# Patient Record
Sex: MALE | Race: WHITE | NOT HISPANIC OR LATINO | Employment: OTHER | URBAN - METROPOLITAN AREA
[De-identification: names, ages, dates, MRNs, and addresses within clinical notes are randomized per-mention and may not be internally consistent; named-entity substitution may affect disease eponyms.]

---

## 2017-01-27 ENCOUNTER — HOSPITAL ENCOUNTER (OUTPATIENT)
Dept: MEDSURG UNIT | Facility: HOSPITAL | Age: 72
End: 2017-01-30
Attending: INTERNAL MEDICINE | Admitting: INTERNAL MEDICINE

## 2017-10-04 ENCOUNTER — HISTORICAL (OUTPATIENT)
Dept: RADIOLOGY | Facility: HOSPITAL | Age: 72
End: 2017-10-04

## 2017-11-21 ENCOUNTER — HISTORICAL (OUTPATIENT)
Dept: ADMINISTRATIVE | Facility: HOSPITAL | Age: 72
End: 2017-11-21

## 2017-11-21 LAB
ABS NEUT (OLG): 5.6 X10(3)/MCL (ref 1.5–6.9)
ALBUMIN SERPL-MCNC: 3.3 GM/DL (ref 3.4–5)
ALBUMIN/GLOB SERPL: 0.7 RATIO
ALP SERPL-CCNC: 63 UNIT/L (ref 30–113)
ALT SERPL-CCNC: 40 UNIT/L (ref 10–45)
AST SERPL-CCNC: 32 UNIT/L (ref 15–37)
BASOPHILS # BLD AUTO: 0 X10(3)/MCL (ref 0–0.1)
BASOPHILS NFR BLD AUTO: 0 % (ref 0–1)
BILIRUB SERPL-MCNC: 0.4 MG/DL (ref 0.1–0.9)
BILIRUBIN DIRECT+TOT PNL SERPL-MCNC: 0.1 MG/DL (ref 0–0.3)
BILIRUBIN DIRECT+TOT PNL SERPL-MCNC: 0.3 MG/DL
BUN SERPL-MCNC: 16 MG/DL (ref 10–20)
CALCIUM SERPL-MCNC: 9.1 MG/DL (ref 8–10.5)
CHLORIDE SERPL-SCNC: 94 MMOL/L (ref 100–108)
CO2 SERPL-SCNC: 27 MMOL/L (ref 21–35)
CREAT SERPL-MCNC: 0.88 MG/DL (ref 0.7–1.3)
EOSINOPHIL # BLD AUTO: 0.2 X10(3)/MCL (ref 0–0.6)
EOSINOPHIL NFR BLD AUTO: 2 % (ref 0–5)
ERYTHROCYTE [DISTWIDTH] IN BLOOD BY AUTOMATED COUNT: 12.4 % (ref 11.5–17)
ERYTHROCYTE [SEDIMENTATION RATE] IN BLOOD: 50 MM/HR (ref 0–15)
GLOBULIN SER-MCNC: 4.6 GM/DL
GLUCOSE SERPL-MCNC: 175 MG/DL (ref 75–116)
HCT VFR BLD AUTO: 36.1 % (ref 42–52)
HGB BLD-MCNC: 12.1 GM/DL (ref 14–18)
LYMPHOCYTES # BLD AUTO: 1.8 X10(3)/MCL (ref 0.5–4.1)
LYMPHOCYTES NFR BLD AUTO: 21.1 % (ref 15–40)
MCH RBC QN AUTO: 31 PG (ref 27–34)
MCHC RBC AUTO-ENTMCNC: 34 GM/DL (ref 31–36)
MCV RBC AUTO: 91 FL (ref 80–99)
MONOCYTES # BLD AUTO: 1.1 X10(3)/MCL (ref 0–1.1)
MONOCYTES NFR BLD AUTO: 12 % (ref 4–12)
NEUTROPHILS # BLD AUTO: 5.6 X10(3)/MCL (ref 1.5–6.9)
NEUTROPHILS NFR BLD AUTO: 64 % (ref 43–75)
PLATELET # BLD AUTO: 490 X10(3)/MCL (ref 140–400)
PMV BLD AUTO: 9.8 FL (ref 6.8–10)
POTASSIUM SERPL-SCNC: 5.4 MMOL/L (ref 3.6–5.2)
PROT SERPL-MCNC: 7.9 GM/DL (ref 6.4–8.2)
RBC # BLD AUTO: 3.95 X10(6)/MCL (ref 4.7–6.1)
SODIUM SERPL-SCNC: 129 MMOL/L (ref 135–145)
WBC # SPEC AUTO: 8.7 X10(3)/MCL (ref 4.5–11.5)

## 2017-11-22 ENCOUNTER — HISTORICAL (OUTPATIENT)
Dept: ADMINISTRATIVE | Facility: HOSPITAL | Age: 72
End: 2017-11-22

## 2017-11-27 ENCOUNTER — HISTORICAL (OUTPATIENT)
Dept: LAB | Facility: HOSPITAL | Age: 72
End: 2017-11-27

## 2017-11-27 LAB
ABS NEUT (OLG): 5.3 X10(3)/MCL (ref 1.5–6.9)
ALBUMIN SERPL-MCNC: 3.4 GM/DL (ref 3.4–5)
ALBUMIN/GLOB SERPL: 0.8 RATIO
ALP SERPL-CCNC: 64 UNIT/L (ref 30–113)
ALT SERPL-CCNC: 27 UNIT/L (ref 10–45)
AST SERPL-CCNC: 26 UNIT/L (ref 15–37)
BASOPHILS # BLD AUTO: 0 X10(3)/MCL (ref 0–0.1)
BASOPHILS NFR BLD AUTO: 1 % (ref 0–1)
BILIRUB SERPL-MCNC: 0.2 MG/DL (ref 0.1–0.9)
BILIRUBIN DIRECT+TOT PNL SERPL-MCNC: 0.1 MG/DL
BILIRUBIN DIRECT+TOT PNL SERPL-MCNC: 0.1 MG/DL (ref 0–0.3)
BUN SERPL-MCNC: 18 MG/DL (ref 10–20)
CALCIUM SERPL-MCNC: 9.2 MG/DL (ref 8–10.5)
CHLORIDE SERPL-SCNC: 95 MMOL/L (ref 100–108)
CO2 SERPL-SCNC: 29 MMOL/L (ref 21–35)
CREAT SERPL-MCNC: 1.13 MG/DL (ref 0.7–1.3)
EOSINOPHIL # BLD AUTO: 0.4 X10(3)/MCL (ref 0–0.6)
EOSINOPHIL NFR BLD AUTO: 4 % (ref 0–5)
ERYTHROCYTE [DISTWIDTH] IN BLOOD BY AUTOMATED COUNT: 12.9 % (ref 11.5–17)
ERYTHROCYTE [SEDIMENTATION RATE] IN BLOOD: 45 MM/HR (ref 0–15)
GLOBULIN SER-MCNC: 4.3 GM/DL
GLUCOSE SERPL-MCNC: 179 MG/DL (ref 75–116)
HCT VFR BLD AUTO: 35.8 % (ref 42–52)
HGB BLD-MCNC: 12.2 GM/DL (ref 14–18)
LYMPHOCYTES # BLD AUTO: 1.9 X10(3)/MCL (ref 0.5–4.1)
LYMPHOCYTES NFR BLD AUTO: 22.6 % (ref 15–40)
MCH RBC QN AUTO: 31 PG (ref 27–34)
MCHC RBC AUTO-ENTMCNC: 34 GM/DL (ref 31–36)
MCV RBC AUTO: 92 FL (ref 80–99)
MONOCYTES # BLD AUTO: 0.8 X10(3)/MCL (ref 0–1.1)
MONOCYTES NFR BLD AUTO: 9 % (ref 4–12)
NEUTROPHILS # BLD AUTO: 5.3 X10(3)/MCL (ref 1.5–6.9)
NEUTROPHILS NFR BLD AUTO: 63 % (ref 43–75)
PLATELET # BLD AUTO: 423 X10(3)/MCL (ref 140–400)
PMV BLD AUTO: 9.4 FL (ref 6.8–10)
POTASSIUM SERPL-SCNC: 4.6 MMOL/L (ref 3.6–5.2)
PROT SERPL-MCNC: 7.7 GM/DL (ref 6.4–8.2)
RBC # BLD AUTO: 3.88 X10(6)/MCL (ref 4.7–6.1)
SODIUM SERPL-SCNC: 134 MMOL/L (ref 135–145)
WBC # SPEC AUTO: 8.4 X10(3)/MCL (ref 4.5–11.5)

## 2018-04-30 ENCOUNTER — HISTORICAL (OUTPATIENT)
Dept: RADIOLOGY | Facility: HOSPITAL | Age: 73
End: 2018-04-30

## 2018-05-11 ENCOUNTER — HISTORICAL (OUTPATIENT)
Dept: SURGERY | Facility: HOSPITAL | Age: 73
End: 2018-05-11

## 2018-05-11 LAB
ABS NEUT (OLG): 6 X10(3)/MCL (ref 1.5–6.9)
ALBUMIN SERPL-MCNC: 3.6 GM/DL (ref 3.4–5)
ALBUMIN/GLOB SERPL: 0.7 RATIO
ALP SERPL-CCNC: 82 UNIT/L (ref 30–113)
ALT SERPL-CCNC: 23 UNIT/L (ref 10–45)
APTT PPP: 28.3 SECOND(S) (ref 25–35)
AST SERPL-CCNC: 20 UNIT/L (ref 15–37)
BASOPHILS # BLD AUTO: 0 X10(3)/MCL (ref 0–0.1)
BASOPHILS NFR BLD AUTO: 0 % (ref 0–1)
BILIRUB SERPL-MCNC: 0.7 MG/DL (ref 0.1–0.9)
BILIRUBIN DIRECT+TOT PNL SERPL-MCNC: 0.2 MG/DL (ref 0–0.3)
BILIRUBIN DIRECT+TOT PNL SERPL-MCNC: 0.5 MG/DL
BUN SERPL-MCNC: 10 MG/DL (ref 10–20)
CALCIUM SERPL-MCNC: 9.4 MG/DL (ref 8–10.5)
CHLORIDE SERPL-SCNC: 94 MMOL/L (ref 100–108)
CO2 SERPL-SCNC: 31 MMOL/L (ref 21–35)
CREAT SERPL-MCNC: 0.95 MG/DL (ref 0.7–1.3)
EOSINOPHIL # BLD AUTO: 0 X10(3)/MCL (ref 0–0.6)
EOSINOPHIL NFR BLD AUTO: 1 % (ref 0–5)
ERYTHROCYTE [DISTWIDTH] IN BLOOD BY AUTOMATED COUNT: 13.5 % (ref 11.5–17)
GLOBULIN SER-MCNC: 5.1 GM/DL
GLUCOSE SERPL-MCNC: 147 MG/DL (ref 75–116)
HCT VFR BLD AUTO: 40 % (ref 42–52)
HGB BLD-MCNC: 13.8 GM/DL (ref 14–18)
INR PPP: 1.1 (ref 0–1.2)
LYMPHOCYTES # BLD AUTO: 1.7 X10(3)/MCL (ref 0.5–4.1)
LYMPHOCYTES NFR BLD AUTO: 19.9 % (ref 15–40)
MCH RBC QN AUTO: 30 PG (ref 27–34)
MCHC RBC AUTO-ENTMCNC: 34 GM/DL (ref 31–36)
MCV RBC AUTO: 86 FL (ref 80–99)
MONOCYTES # BLD AUTO: 0.8 X10(3)/MCL (ref 0–1.1)
MONOCYTES NFR BLD AUTO: 9 % (ref 4–12)
NEUTROPHILS # BLD AUTO: 6 X10(3)/MCL (ref 1.5–6.9)
NEUTROPHILS NFR BLD AUTO: 70 % (ref 43–75)
PLATELET # BLD AUTO: 427 X10(3)/MCL (ref 140–400)
PMV BLD AUTO: 9.2 FL (ref 6.8–10)
POTASSIUM SERPL-SCNC: 4.4 MMOL/L (ref 3.6–5.2)
PROT SERPL-MCNC: 8.7 GM/DL (ref 6.4–8.2)
PROTHROMBIN TIME: 11.1 SECOND(S) (ref 9–12)
RBC # BLD AUTO: 4.63 X10(6)/MCL (ref 4.7–6.1)
SODIUM SERPL-SCNC: 131 MMOL/L (ref 135–145)
WBC # SPEC AUTO: 8.6 X10(3)/MCL (ref 4.5–11.5)

## 2018-05-14 ENCOUNTER — HISTORICAL (OUTPATIENT)
Dept: ANESTHESIOLOGY | Facility: HOSPITAL | Age: 73
End: 2018-05-14

## 2018-05-17 LAB — GRAM STN SPEC: NORMAL

## 2018-05-19 LAB — FINAL CULTURE: NORMAL

## 2018-05-22 ENCOUNTER — HISTORICAL (OUTPATIENT)
Dept: RADIOLOGY | Facility: HOSPITAL | Age: 73
End: 2018-05-22

## 2022-04-30 NOTE — OP NOTE
PREOPERATIVE DIAGNOSIS:  Osteomyelitis of the distal portion of the proximal phalanx of the right big toe and osteomyelitis of the proximal portion of the distal phalanx of the right big toe.    POSTOPERATIVE DIAGNOSIS:  Osteomyelitis of the distal portion of the proximal phalanx of the right big toe and osteomyelitis of the proximal portion of the distal phalanx of the right big toe.    OPERATION PERFORMED:  Exploration of the right big toe and drainage of the infected area, including abscess; hemiphalangectomy of the distal portion of the proximal phalanx of the right big toe; hemiphalangectomy of the proximal portion of the distal phalanx of the right big toe; excision of the interphalangeal joint of the right big toe.    ANESTHESIA:  Ankle block with IV sedation.    PROCEDURE IN DETAIL:  This is a 72-year-old male patient who was brought in because of chronic infection of the right big toe.  The patient had extensive workup done.  Workup has shown patient has abscess formation in the right big toe and osteomyelitis.  X-rays are showing patient has osteomyelitis of the distal portion and proximal portion of the distal phalanx, including the joint.  Hence, decision was made to drain the area and excise the bone ends on either side, proximal portion of the distal phalanx and distal portion of the proximal phalanx.  Chronic sinus and the wound excised by elliptical incision on the plantar aspect, and subsequently deeper tissue until the bone was identified.  Bone curette was used to curette the area.  The joint was completely curetted.  The joint is completely destroyed.  The distal portion of the proximal phalanx was transected and removed.  Again, the proximal portion of the distal phalanx also was transected.  In fact, I had done a hemiphalangectomy on both bones and joint interphalangeal joint was also completely excised.  Hemostasis was obtained with diathermy.  Wound thoroughly irrigated with warm saline  and subsequently packed with Iodoform gauze.      Dressing applied.  He tolerated the procedure well.  He received a gram of Ancef prior to the procedure.  He was transferred to his room in good condition.        ANA LILIA/MISBAH   DD: 05/14/2018 1829   DT: 05/14/2018 1926  Job # [id number]    cc: CHAYA Frederick M.D.

## 2022-04-30 NOTE — H&P
CHIEF COMPLAINT:  Nonhealing wound of the right big toe.    HISTORY OF PRESENT ILLNESS:  This 72-year-old male patient is well known to me for the several months.  Patient was initially seen by me in November 2017 because of an ulcer of the right big toe.  I debrided the same, and because of nonhealing ulcer I had referred him to Dr. Felix Drake, a vascular surgeon.  He has evaluated the foot and the lower extremity and no evidence of any significant arterial blockage was noted.  The patient had multiple minor debridements done in my office.  I also obtained an arterial Doppler study in November 2017, which showed patient had monophasic flow pattern of the right posterior tibial perineal and dorsalis pedis.  Hence, that is the time I have referred him to the Dr. Nikolai Drake.  He has evaluated and noticed no significant blockers to perform any vascular procedures.  In November 2017, I did debridement of soft tissue of the sole of the medial aspect of the right big toe and that area has healed.  At present, patient has ulcer on the dorsal area and has been evaluated in my office  periodically.  He had a nuclear medicine study done of the right big toe in recent past, which showed infected right great toe with the level of the white cell accumulation in the right big toe.  Also I have obtained a plain x-ray of the right foot which showed destructive bony changes in the interphalangeal joint of the great toe consistent with osteomyelitis.  The patient has nonhealing wound at present.  The patient is brought in for debridement of the area and possibly removal of part of the bone.  If necessary I will remove the whole distal phalanges also.    PAST MEDICAL HISTORY:  He has diabetes mellitus, hypertensive cardiovascular disease, hyperlipidemia, glaucoma, history of prostate malignancy, hypothyroidism, osteoarthritis, bipolar disorder, depression.    PAST SURGICAL HISTORY:  The patient has had debridement of the  foot done in the past.    SOCIAL HISTORY:  He is a CPA by profession.  He does not smoke.  No history of alcohol abuse.  No history of any illicit drug use.    FAMILY HISTORY:  Diabetes mellitus present.    MEDICATIONS:  The patient is on multiple medications, including valsartan, Flomax, Bystolic, metformin, levothyroxine, hydralazine, glimepiride, diltiazem, benztropine, aripiprazole.    REVIEW OF SYSTEMS:  RESPIRATORY:  No shortness of breath.   CARDIOVASCULAR:  No chest pain.  Known hypertensive.   NEUROLOGIC: Patient has peripheral neuropathy.   GASTROINTESTINAL:  No abdominal pain.   ENDOCRINE:  He has diabetes mellitus.   MUSCULOSKELETAL:  Vague joint pains.   GENITOURINARY:  History of prostate carcinoma.     HEMATOLOGY:  No bleeding tendencies.   PSYCHIATRIC:  He has bipolar disease, depression.     GENERAL:  Patient has an ulcerated right big toe with osteomyelitis.   OTHER SYSTEMS:  No complaints.    PHYSICAL EXAMINATION:  GENERAL:  Condition of the patient is satisfactory.   VITAL SIGNS:  Stable.   HEENT:  No scalp lesion.  Oral cavity and throat normal.     NECK:  Supple.  Thyroid, no nodules.  Trachea central.  No cervical or supraclavicular nodes are noted.  No carotid bruit.  Jugular venous pressure normal.   CHEST:  Air entry equal on both sides.     LUNGS:  Clear.  No rales or wheezing.     HEART:  Regular, no murmur.     ABDOMEN:  Soft.  Liver and spleen not enlarged.   No masses palpable.  Good bowel sounds.  Hernia sites normal.     GENITOURINARY:  Genitalia normal.   EXTREMITIES:  Femoral, popliteal pulses present.  Pedal pulses very feeble.  Right big toe shows evidence of chronic infection with ulceration sinus formation and the toe is swollen.   NEUROLOGICAL:  The patient has peripheral neuropathy.     EXTREMITIES:  Upper extremities, normal.  Spine is normal.    CLINICAL IMPRESSION:  Chronic infection of the right big toe with osteomyelitis of the phalanges of the toe.      PLAN:  The  patient is scheduled for debridement of the soft tissue and bone, possibly removing distal phalanges.        ANA LILIA/MISBAH   DD: 05/14/2018 0544   DT: 05/14/2018 0648  Job # [id number]    cc: CHAYA Frederick M.D.

## 2023-03-02 ENCOUNTER — HOSPITAL ENCOUNTER (EMERGENCY)
Facility: HOSPITAL | Age: 78
Discharge: HOME | End: 2023-03-02
Attending: EMERGENCY MEDICINE
Payer: MEDICARE

## 2023-03-02 VITALS
DIASTOLIC BLOOD PRESSURE: 100 MMHG | SYSTOLIC BLOOD PRESSURE: 187 MMHG | WEIGHT: 232 LBS | OXYGEN SATURATION: 98 % | HEIGHT: 73 IN | RESPIRATION RATE: 18 BRPM | HEART RATE: 75 BPM | TEMPERATURE: 98 F | BODY MASS INDEX: 30.75 KG/M2

## 2023-03-02 DIAGNOSIS — S50.02XA CONTUSION OF LEFT ELBOW, INITIAL ENCOUNTER: ICD-10-CM
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
## 2023-03-02 DIAGNOSIS — J18.9 ATYPICAL PNEUMONIA: ICD-10-CM

## 2023-03-02 DIAGNOSIS — S20.229A CONTUSION OF THORACIC SPINE: Primary | ICD-10-CM

## 2023-03-02 PROCEDURE — 99285 EMERGENCY DEPT VISIT HI MDM: CPT | Mod: 25

## 2023-03-02 PROCEDURE — 90715 TDAP VACCINE 7 YRS/> IM: CPT | Performed by: NURSE PRACTITIONER

## 2023-03-02 PROCEDURE — 63600175 PHARM REV CODE 636 W HCPCS: Performed by: NURSE PRACTITIONER

## 2023-03-02 PROCEDURE — 90471 IMMUNIZATION ADMIN: CPT | Performed by: NURSE PRACTITIONER

## 2023-03-02 RX ORDER — AZITHROMYCIN 250 MG/1
250 TABLET, FILM COATED ORAL DAILY
Qty: 6 TABLET | Refills: 0 | Status: SHIPPED | OUTPATIENT
Start: 2023-03-02 | End: 2023-05-10

## 2023-03-02 RX ADMIN — TETANUS TOXOID, REDUCED DIPHTHERIA TOXOID AND ACELLULAR PERTUSSIS VACCINE, ADSORBED 0.5 ML: 5; 2.5; 8; 8; 2.5 SUSPENSION INTRAMUSCULAR at 05:03

## 2023-03-02 NOTE — ED PROVIDER NOTES
Encounter Date: 3/2/2023       History     Chief Complaint   Patient presents with    Fall     Trip and fall in his grass. C/o left shoulder pain. Denies LOC.      See MDM    The history is provided by the patient. No  was used.   Review of patient's allergies indicates:   Allergen Reactions    Sulfa (sulfonamide antibiotics)      Other reaction(s): itching     Past Medical History:   Diagnosis Date    Diabetes mellitus     Hypertension      History reviewed. No pertinent surgical history.  History reviewed. No pertinent family history.  Social History     Tobacco Use    Smoking status: Never    Smokeless tobacco: Never   Substance Use Topics    Alcohol use: Not Currently    Drug use: Never     Review of Systems   Constitutional:  Negative for fever.   Respiratory:  Negative for cough and shortness of breath.    Cardiovascular:  Negative for chest pain.   Gastrointestinal:  Negative for abdominal pain.   Genitourinary:  Negative for difficulty urinating and dysuria.   Musculoskeletal:  Positive for back pain. Negative for gait problem.   Skin:  Negative for color change.   Neurological:  Negative for dizziness, speech difficulty and headaches.   Psychiatric/Behavioral:  Negative for hallucinations and suicidal ideas.    All other systems reviewed and are negative.    Physical Exam     Initial Vitals [03/02/23 1549]   BP Pulse Resp Temp SpO2   (!) 187/100 75 18 98.1 °F (36.7 °C) 98 %      MAP       --         Physical Exam    Nursing note and vitals reviewed.  Constitutional: He appears well-developed and well-nourished.   HENT:   Head: Normocephalic.   Eyes: EOM are normal.   Neck: Neck supple.   Normal range of motion.  Cardiovascular:  Normal rate, regular rhythm, normal heart sounds and intact distal pulses.           Pulmonary/Chest: Breath sounds normal.   Abdominal: Abdomen is soft. Bowel sounds are normal.   Musculoskeletal:         General: Normal range of motion.      Cervical back:  Normal range of motion and neck supple.      Comments: Tenderness to the left and mid T-spine.  Skin tear noted to the left elbow     Neurological: He is alert and oriented to person, place, and time. He has normal strength.   Skin: Skin is warm and dry. Capillary refill takes less than 2 seconds.   Psychiatric: He has a normal mood and affect. His behavior is normal. Judgment and thought content normal.       ED Course   Procedures  Labs Reviewed - No data to display       Imaging Results              X-Ray Elbow Complete Left (Preliminary result)  Result time 03/02/23 16:47:24      Wet Read by JL Contreras (03/02/23 16:47:24, [facility] - Emergency Dept, Emergency Medicine)    No acute fracture seen.  No sail sign.  No fat pad noted.                                     CT Thoracic Spine Without Contrast (Final result)  Result time 03/02/23 16:55:34      Final result by Felix Hodges MD (03/02/23 16:55:34)                   Impression:      1. No acute osseous defect identified  2. Thoracic spondylosis  3. Osteopenia  4. Scattered patchy infiltrate at the upper lungs bilaterally      Electronically signed by: Felix Hodges  Date:    03/02/2023  Time:    16:55               Narrative:    EXAMINATION:  CT THORACIC SPINE WITHOUT CONTRAST    CLINICAL HISTORY:  , fall;    TECHNIQUE,:  PATIENT RADIATION DOSE: DLP(mGycm) 1591    As per PQRS measures, all CT scans at this facility used dose modulation, iterative reconstruction, and/or weight based dose adjustment when appropriate to reduce radiation dose to as low as reasonably achievable.    COMPARISON:  None available    FINDINGS:  Serial axial images were obtained of the thoracic spine without the administration of intravenous contrast.  Additional coronal and sagittal images were performed.  Both soft tissue and bone windows were obtained. Vertebral body height and alignment grossly intact.  No fracture or subluxation is seen.  Disc space  narrowing and osteophyte formation is seen.  Bony structures are osteopenic.  A small Schmorl's node is evident at the the inferior endplate of T10.  There is mild curvature of the thoracic spine with the convexity to the right.  Atherosclerosis is seen within the aorta.  There is no loss of integrity to the bony spinal canal.  No bony central spinal stenosis is identified.  Evaluation of posterior disc bulge is limited.  No abnormal paraspinal mass is identified without the administration of IV contrast.  Scattered patchy infiltrate and atelectasis is evident at the upper lungs bilaterally.                                       Medications   Tdap (BOOSTRIX) vaccine injection 0.5 mL (0.5 mLs Intramuscular Given 3/2/23 2554)     Medical Decision Making:   Initial Assessment:   Historian:  Patient and EMS.  Patient is a 77-year-old male  that presents with fall that has been present today. Associated symptoms left elbow skin tear and mid T-spine pain. Surrounding information is nothing. Exacerbated by nothing. Relieved by nothing. Patient treatment prior to arrival none. Risk factors include age. Other history pertaining to this complaint nothing.   Assessment:  See physical exam.    Differential Diagnosis:   T-spine sprain, T-spine fracture, T-spine strain, elbow skin tear, elbow contusion, elbow fracture  ED Management:  History was obtained.  Physical was performed.  Independent review of outside records:  10/02/2017 was seen at an outside ER for a fall.  10/20/2017 was seen at outside ER for a fall.  Had the nurse clean the skin tear on his left elbow.  Had her place in Adaptic, 4x4s and Kym wrap.  X-ray was negative.  CT of the T-spine showed no acute fractures.  Did show some scattered infiltrates.  Patient has been suffering from a cough.  I will put him on Zithromax for atypical pneumonia.  No medical or surgical consult for indicated.  No social determinants that affect his healthcare were  noted.  Additional MDM:     X-Rays: Left elbow x-ray with no acute findings.  T-spine CT shows no acute fractures but some scattered infiltrates to the lungs.                      Clinical Impression:   Final diagnoses:  [W19.XXXA] Fall  [S20.229A] Contusion of thoracic spine - left (Primary)  [J18.9] Atypical pneumonia  [S50.02XA] Contusion of left elbow, initial encounter        ED Disposition Condition    Discharge Stable          ED Prescriptions       Medication Sig Dispense Start Date End Date Auth. Provider    azithromycin (Z-JASMIN) 250 MG tablet Take 1 tablet (250 mg total) by mouth once daily. Take first 2 tablets together, then 1 every day until finished. 6 tablet 3/2/2023 -- JL Contreras          Follow-up Information    None          JL Contreras  03/02/23 9316

## 2023-03-06 ENCOUNTER — HOSPITAL ENCOUNTER (OUTPATIENT)
Dept: RADIOLOGY | Facility: HOSPITAL | Age: 78
Discharge: HOME | End: 2023-03-06
Attending: INTERNAL MEDICINE
Payer: MEDICARE

## 2023-03-06 DIAGNOSIS — M25.512 PAIN IN LEFT SHOULDER: ICD-10-CM

## 2023-03-06 PROCEDURE — 73030 X-RAY EXAM OF SHOULDER: CPT | Mod: TC,LT

## 2023-05-10 ENCOUNTER — HOSPITAL ENCOUNTER (OUTPATIENT)
Dept: WOUND CARE | Facility: HOSPITAL | Age: 78
Discharge: HOME | End: 2023-05-10
Attending: NURSE PRACTITIONER
Payer: MEDICARE

## 2023-05-10 VITALS
HEIGHT: 75 IN | SYSTOLIC BLOOD PRESSURE: 200 MMHG | BODY MASS INDEX: 31.08 KG/M2 | WEIGHT: 250 LBS | DIASTOLIC BLOOD PRESSURE: 81 MMHG | HEART RATE: 65 BPM | RESPIRATION RATE: 18 BRPM

## 2023-05-10 DIAGNOSIS — E11.42 DIABETIC POLYNEUROPATHY ASSOCIATED WITH TYPE 2 DIABETES MELLITUS: ICD-10-CM

## 2023-05-10 DIAGNOSIS — L97.911 NON-PRESSURE CHRONIC ULCER RIGHT LOWER LEG, LIMITED TO BREAKDOWN SKIN: ICD-10-CM

## 2023-05-10 DIAGNOSIS — L97.921 NON-PRESSURE CHRONIC ULCER OF LEFT LOWER LEG, LIMITED TO BREAKDOWN OF SKIN: Primary | ICD-10-CM

## 2023-05-10 DIAGNOSIS — E11.43 DIABETIC AUTONOMIC NEUROPATHY ASSOCIATED WITH TYPE 2 DIABETES MELLITUS: ICD-10-CM

## 2023-05-10 PROCEDURE — 99203 OFFICE O/P NEW LOW 30 MIN: CPT | Mod: 25

## 2023-05-10 PROCEDURE — 97597 DBRDMT OPN WND 1ST 20 CM/<: CPT

## 2023-05-10 PROCEDURE — 27000999 HC MEDICAL RECORD PHOTO DOCUMENTATION

## 2023-05-10 PROCEDURE — 97598 DBRDMT OPN WND ADDL 20CM/<: CPT

## 2023-05-10 RX ORDER — METFORMIN HYDROCHLORIDE 1000 MG/1
1000 TABLET ORAL 2 TIMES DAILY
COMMUNITY
Start: 2023-02-10

## 2023-05-10 RX ORDER — LEVOTHYROXINE SODIUM 25 UG/1
25 TABLET ORAL
COMMUNITY
Start: 2023-02-23

## 2023-05-10 RX ORDER — HYDRALAZINE HYDROCHLORIDE 50 MG/1
50 TABLET, FILM COATED ORAL 3 TIMES DAILY
COMMUNITY
Start: 2023-05-05

## 2023-05-10 RX ORDER — ARIPIPRAZOLE 15 MG/1
TABLET ORAL
COMMUNITY
Start: 2023-04-03

## 2023-05-10 RX ORDER — ATORVASTATIN CALCIUM 40 MG/1
40 TABLET, FILM COATED ORAL
COMMUNITY
Start: 2023-02-01

## 2023-05-10 RX ORDER — GLIMEPIRIDE 4 MG/1
4 TABLET ORAL 2 TIMES DAILY
COMMUNITY
Start: 2023-05-05

## 2023-05-10 RX ORDER — INSULIN LISPRO 100 [IU]/ML
INJECTION, SOLUTION INTRAVENOUS; SUBCUTANEOUS
COMMUNITY
Start: 2023-03-01

## 2023-05-10 RX ORDER — FUROSEMIDE 20 MG/1
20 TABLET ORAL
COMMUNITY
Start: 2023-04-06

## 2023-05-10 RX ORDER — DILTIAZEM HYDROCHLORIDE 120 MG/1
120 TABLET, FILM COATED ORAL
COMMUNITY
Start: 2023-05-05

## 2023-05-10 RX ORDER — BENZTROPINE MESYLATE 0.5 MG/1
0.5 TABLET ORAL NIGHTLY
COMMUNITY
Start: 2023-03-29

## 2023-05-10 RX ORDER — INSULIN GLARGINE 100 [IU]/ML
INJECTION, SOLUTION SUBCUTANEOUS
COMMUNITY
Start: 2023-01-24

## 2023-05-10 RX ORDER — OLMESARTAN MEDOXOMIL 40 MG/1
40 TABLET ORAL
COMMUNITY
Start: 2023-05-05

## 2023-05-10 RX ORDER — MUPIROCIN 20 MG/G
OINTMENT TOPICAL 3 TIMES DAILY
COMMUNITY
Start: 2023-02-27

## 2023-05-10 NOTE — PROGRESS NOTES
Subjective:       Patient ID: Drew Oliva is a 77 y.o. male.    Chief Complaint: No chief complaint on file.    HPI    Mr. Oliva was referred to clinic by his PCP, Dr. Kaur.  The patient reports that in March, 2023, he tripped and fell at home and broke to ribs.  He healed from that situation, however, shortly afterwards he noticed that his lower legs began to swell and blisters began developing bilaterally.  He went to Dr. Kaur who felt that perhaps it was impact ago.  He was given Keflex in early April.  The wounds did not heal so the patient returned to Dr. Spears on 05/04/2023 and at that time he was referred to wound clinic.    He is a diabetic.  His last A1c was 6.6 in January, 2023.  His blood sugar today was 221.  A [place] Dragan was performed today with 0/10 sensation.  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  Doppler U/S were done with weak petal pulses, and unable to obtain posterior tibial pulses.  The patient has been referred for Cardiovascular U/S which will be done on 5/15/23 and 11:30 am.  He has a history of renal U/S, however, he has no cardiovascular history noted.    His legs were assessed today.  There are scattered open wounds at the lower bilateral legs.  A culture was obtained from the wounds on the right lower extremity.  He was given Hibiclens to wash the legs x 3 days and we will use silver alginate until the culture and U/S are reviewed.  He does see podiatrist, Dr. Mehta, in [place] for a large callus at the right great toe metatarsal head and for toe nail clipping.       Review of Systems      Objective:      Vitals:    05/10/23 0942   BP: (!) 200/81   Pulse: 65   Resp: 18       Physical Exam       Altered Skin Integrity 05/10/23 1001 Right anterior;lower Leg #1 (Active)   05/10/23 1001   Altered Skin Integrity Present on Admission - Did Patient arrive to the hospital with altered skin?: yes   Side: Right   Orientation:  anterior;lower   Location: Leg   Wound Number: #1   Is this injury device related?:    Primary Wound Type:    Description of Altered Skin Integrity:    Ankle-Brachial Index:    Pulses:    Removal Indication and Assessment:    Wound Outcome:    (Retired) Wound Length (cm):    (Retired) Wound Width (cm):    (Retired) Depth (cm):    Wound Description (Comments):    Removal Indications:    Dressing Appearance Moist drainage 05/10/23 1000   Drainage Amount Moderate 05/10/23 1000   Drainage Characteristics/Odor Serosanguineous 05/10/23 1000   Appearance Pink;Moist;Tan;Fibrin 05/10/23 1000   Tissue loss description Full thickness 05/10/23 1000   Periwound Area Redness;Dry 05/10/23 1000   Wound Edges Irregular 05/10/23 1000   Wound Length (cm) 9.9 cm 05/10/23 1000   Wound Width (cm) 10.8 cm 05/10/23 1000   Wound Depth (cm) 0.2 cm 05/10/23 1000   Wound Volume (cm^3) 21.384 cm^3 05/10/23 1000   Wound Surface Area (cm^2) 106.92 cm^2 05/10/23 1000   Care Cleansed with:;Wound cleanser 05/10/23 1000   Dressing Applied 05/10/23 1000   Dressing Change Due 05/11/23 05/10/23 1000            Altered Skin Integrity 05/10/23 1001 Left anterior;lower Leg #2 (Active)   05/10/23 1001   Altered Skin Integrity Present on Admission - Did Patient arrive to the hospital with altered skin?: yes   Side: Left   Orientation: anterior;lower   Location: Leg   Wound Number: #2   Is this injury device related?:    Primary Wound Type:    Description of Altered Skin Integrity:    Ankle-Brachial Index:    Pulses:    Removal Indication and Assessment:    Wound Outcome:    (Retired) Wound Length (cm):    (Retired) Wound Width (cm):    (Retired) Depth (cm):    Wound Description (Comments):    Removal Indications:    Dressing Appearance Moist drainage 05/10/23 1000   Drainage Amount Moderate 05/10/23 1000   Drainage Characteristics/Odor Serosanguineous 05/10/23 1000   Appearance Pink;Tan;Moist;Fibrin 05/10/23 1000   Tissue loss description Full thickness  "05/10/23 1000   Periwound Area Redness;Dry 05/10/23 1000   Wound Edges Irregular 05/10/23 1000   Wound Length (cm) 4.6 cm 05/10/23 1000   Wound Width (cm) 7.3 cm 05/10/23 1000   Wound Depth (cm) 0.2 cm 05/10/23 1000   Wound Volume (cm^3) 6.716 cm^3 05/10/23 1000   Wound Surface Area (cm^2) 33.58 cm^2 05/10/23 1000   Care Wound cleanser;Cleansed with: 05/10/23 1000   Dressing Applied 05/10/23 1000   Dressing Change Due 05/11/23 05/10/23 1000             Right lower leg, pre     Right lower leg, pre     Right lower leg, post    Right lower leg, post        Left lower leg, pre     Post    All wounds:  silver alginate, kerlix, coban  RC      Assessment:         ICD-10-CM ICD-9-CM   1. Non-pressure chronic ulcer of left lower leg, limited to breakdown of skin  L97.921 707.10   2. Non-pressure chronic ulcer right lower leg, limited to breakdown skin  L97.911 707.10   3. Diabetic autonomic neuropathy associated with type 2 diabetes mellitus  E11.43 250.60     337.1   4. Diabetic polyneuropathy associated with type 2 diabetes mellitus  E11.42 250.60     357.2           Procedures:     Debridement     Date/Time: 5/10/2023 9:20 AM  Performed by: aSrah Rodriguez NP  Authorized by: Sarah Rodriguez NP      Time out: Immediately prior to procedure a "time out" was called to verify the correct patient, procedure, equipment, support staff and site/side marked as required.     Consent Done?:  Yes (Verbal)     Preparation: Patient was prepped and draped with clean technique    Local anesthesia used?: No       Wound Details:    Location:  Right leg    Type of Debridement:  Non-excisional       Length (cm):  9.9       Area (sq cm):  106.92       Width (cm):  10.8       Percent Debrided (%):  10       Depth (cm):  0.2       Total Area Debrided (sq cm):  10.69    Depth of debridement:  Epidermis/Dermis    Devitalized tissue debrided:  Biofilm, Exudate and Fibrin    Instruments:  Forceps and Scissors     2nd Wound Details:     " Location:  Left leg    Type of Debridement:  Non-excisional       Length (cm):  4.6       Area (sq cm):  33.58       Width (cm):  7.3       Percent Debrided (%):  10       Depth (cm):  0.2       Total Area Debrided (sq cm):  3.36    Depth of debridement:  Epidermis/Dermis    Devitalized tissue debrided:  Biofilm, Fibrin and Exudate    Instruments:  Forceps and Scissors      RC      [] Yes [] No   I & D performed  [] Yes [] No   Excisional debridement performed  [x] Yes [] No   Selective debridement performed           [] Yes [] No   Mechanical debridement performed         [] Yes [] No  Silver nitrate applied                                     [] Yes [] No  Labs ordered this visit                                  [] Yes [] No   Imaging ordered this visit                           [] Yes [] No   Tissue pathology and/or culture taken         MEDICATIONS    Current Outpatient Medications:     ARIPiprazole (ABILIFY) 15 MG Tab, TAKE 1 TABLET BY MOUTH EVERY DAY AT BEDTIME FOR 90 DAYS, Disp: , Rfl:     atorvastatin (LIPITOR) 40 MG tablet, Take 40 mg by mouth., Disp: , Rfl:     benztropine (COGENTIN) 0.5 MG tablet, Take 0.5 mg by mouth every evening., Disp: , Rfl:     diltiaZEM (CARDIZEM) 120 MG tablet, Take 120 mg by mouth., Disp: , Rfl:     furosemide (LASIX) 20 MG tablet, Take 20 mg by mouth., Disp: , Rfl:     glimepiride (AMARYL) 4 MG tablet, Take 4 mg by mouth 2 (two) times daily., Disp: , Rfl:     HUMALOG KWIKPEN INSULIN 100 unit/mL pen, INJECT 15 UNITS SUBCUTANEOUSLY THREE TIMES DAILY, Disp: , Rfl:     hydrALAZINE (APRESOLINE) 50 MG tablet, Take 50 mg by mouth 3 (three) times daily., Disp: , Rfl:     LANTUS SOLOSTAR U-100 INSULIN glargine 100 units/mL SubQ pen, SMARTSIG:15 Unit(s) SUB-Q Daily, Disp: , Rfl:     levothyroxine (SYNTHROID) 25 MCG tablet, Take 25 mcg by mouth., Disp: , Rfl:     metFORMIN (GLUCOPHAGE) 1000 MG tablet, Take 1,000 mg by mouth 2 (two) times daily., Disp: , Rfl:     mupirocin (BACTROBAN) 2 %  ointment, Apply topically 3 (three) times daily., Disp: , Rfl:     olmesartan (BENICAR) 40 MG tablet, Take 40 mg by mouth., Disp: , Rfl:    Review of patient's allergies indicates:   Allergen Reactions    Sulfa (sulfonamide antibiotics)      Other reaction(s): itching       DIAGNOSTICS      Labs/Scans/Micro:    CBC:   Lab Results   Component Value Date    WBC 8.6 05/11/2018    HGB 13.8 (L) 05/11/2018    HCT 40.0 (L) 05/11/2018    MCV 86 05/11/2018     (H) 05/11/2018       Sedimentation rate:   Lab Results   Component Value Date    SEDRATE 45 (H) 11/27/2017    C-reactive protein: No results found for: CRP Chemistry: [unfilled]  HBA1C: No components found for: HBA1C    Ankle Brachial Index: No results found for this or any previous visit.      Imaging:    Plain film: No results found for this or any previous visit.    MRI: No results found for this or any previous visit.   No results found for this or any previous visit.    Bone Scan: No results found for this or any previous visit.   No results found for this or any previous visit.      Vascular studies: US roopa/ David 05/15 @ 11:30am    Microbiology: Cx obtained 5/10/23    HOME HEALTH AGENCY:    TIMES PER WEEK/DAYS:    WOUND CARE ORDERS:    Bilateral lower legs: cleanse with hibicleans and scrub brush for next 3 days, after 3 days use wound cleanser, place silver alginate over open areas, wrap lightly with kerlix and coban, change these dressings daily    Follow up in 1 week (on 5/17/2023) for BLE.        Electronically signed:  Sarah Rodriguez NP

## 2023-05-10 NOTE — PROCEDURES
"Debridement    Date/Time: 5/10/2023 9:20 AM  Performed by: Sarah Rodriguez NP  Authorized by: Sarah Rodriguez NP     Time out: Immediately prior to procedure a "time out" was called to verify the correct patient, procedure, equipment, support staff and site/side marked as required.    Consent Done?:  Yes (Verbal)    Preparation: Patient was prepped and draped with clean technique    Local anesthesia used?: No      Wound Details:    Location:  Right leg    Type of Debridement:  Non-excisional       Length (cm):  9.9       Area (sq cm):  106.92       Width (cm):  10.8       Percent Debrided (%):  10       Depth (cm):  0.2       Total Area Debrided (sq cm):  10.69    Depth of debridement:  Epidermis/Dermis    Devitalized tissue debrided:  Biofilm, Exudate and Fibrin    Instruments:  Forceps and Scissors    2nd Wound Details:     Location:  Left leg    Type of Debridement:  Non-excisional       Length (cm):  4.6       Area (sq cm):  33.58       Width (cm):  7.3       Percent Debrided (%):  10       Depth (cm):  0.2       Total Area Debrided (sq cm):  3.36    Depth of debridement:  Epidermis/Dermis    Devitalized tissue debrided:  Biofilm, Fibrin and Exudate    Instruments:  Forceps and Scissors     RC  "

## 2023-05-15 ENCOUNTER — HOSPITAL ENCOUNTER (OUTPATIENT)
Dept: RADIOLOGY | Facility: HOSPITAL | Age: 78
Discharge: HOME | End: 2023-05-15
Attending: NURSE PRACTITIONER
Payer: MEDICARE

## 2023-05-15 DIAGNOSIS — L97.921 NON-PRESSURE CHRONIC ULCER OF LEFT LOWER LEG, LIMITED TO BREAKDOWN OF SKIN: ICD-10-CM

## 2023-05-15 PROCEDURE — 93925 LOWER EXTREMITY STUDY: CPT | Mod: TC

## 2023-05-15 PROCEDURE — 93970 EXTREMITY STUDY: CPT | Mod: TC

## 2023-05-17 ENCOUNTER — HOSPITAL ENCOUNTER (OUTPATIENT)
Dept: WOUND CARE | Facility: HOSPITAL | Age: 78
Discharge: HOME | End: 2023-05-17
Attending: NURSE PRACTITIONER
Payer: MEDICARE

## 2023-05-17 VITALS
DIASTOLIC BLOOD PRESSURE: 79 MMHG | RESPIRATION RATE: 18 BRPM | HEIGHT: 75 IN | HEART RATE: 71 BPM | WEIGHT: 250 LBS | SYSTOLIC BLOOD PRESSURE: 185 MMHG | BODY MASS INDEX: 31.08 KG/M2

## 2023-05-17 DIAGNOSIS — E11.43 DIABETIC AUTONOMIC NEUROPATHY ASSOCIATED WITH TYPE 2 DIABETES MELLITUS: ICD-10-CM

## 2023-05-17 DIAGNOSIS — I73.9 ARTERIAL INSUFFICIENCY OF LOWER EXTREMITY: ICD-10-CM

## 2023-05-17 DIAGNOSIS — I73.9 PERIPHERAL VASCULAR DISEASE: ICD-10-CM

## 2023-05-17 DIAGNOSIS — E11.42 DIABETIC POLYNEUROPATHY ASSOCIATED WITH TYPE 2 DIABETES MELLITUS: ICD-10-CM

## 2023-05-17 DIAGNOSIS — L97.911 NON-PRESSURE CHRONIC ULCER RIGHT LOWER LEG, LIMITED TO BREAKDOWN SKIN: ICD-10-CM

## 2023-05-17 DIAGNOSIS — L97.921 NON-PRESSURE CHRONIC ULCER OF LEFT LOWER LEG, LIMITED TO BREAKDOWN OF SKIN: Primary | ICD-10-CM

## 2023-05-17 DIAGNOSIS — I87.2 VENOUS REFLUX: ICD-10-CM

## 2023-05-17 PROCEDURE — 99213 OFFICE O/P EST LOW 20 MIN: CPT

## 2023-05-17 PROCEDURE — 27000999 HC MEDICAL RECORD PHOTO DOCUMENTATION

## 2023-05-17 RX ORDER — DOXYCYCLINE HYCLATE 100 MG
100 TABLET ORAL 2 TIMES DAILY
Qty: 20 TABLET | Refills: 0 | Status: SHIPPED | OUTPATIENT
Start: 2023-05-17 | End: 2023-05-27

## 2023-05-17 NOTE — PROGRESS NOTES
Subjective:       Patient ID: Drew Oliva is a 77 y.o. male.    Chief Complaint: Arterial Wound Follow Up (Right anterior lower leg/Left anterior lower leg )    HPI    5/10/23 - Mr. Oliva was referred to clinic by his PCP, Dr. Kaur.  The patient reports that in March, 2023, he tripped and fell at home and broke to ribs.  He healed from that situation, however, shortly afterwards he noticed that his lower legs began to swell and blisters began developing bilaterally.  He went to Dr. Kaur who felt that perhaps it was impact ago.  He was given Keflex in early April.  The wounds did not heal so the patient returned to Dr. Spears on 05/04/2023 and at that time he was referred to wound clinic.    He is a diabetic.  His last A1c was 6.6 in January, 2023.  His blood sugar today was 221.  A Duryea Dragan was performed today with 0/10 sensation.  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  Doppler U/S were done with weak petal pulses, and unable to obtain posterior tibial pulses.  The patient has been referred for Cardiovascular U/S which will be done on 5/15/23 and 11:30 am.  He has a history of renal U/S, however, he has no cardiovascular history noted.    His legs were assessed today.  There are scattered open wounds at the lower bilateral legs.  A culture was obtained from the wounds on the right lower extremity.  He was given Hibiclens to wash the legs x 3 days and we will use silver alginate until the culture and U/S are reviewed.  He does see podiatrist, Dr. Mehta, in [place] for a large callus at the right great toe metatarsal head and for toe nail clipping.     5/17/23 - Mr. Oliva returns today for follow up on the bilateral lowe extremity wounds.  He did have U/S done on 5/10/23 (results below) which show mixed peripheral vascular disease with bilateral stenosis of 50-75% throughout the superficial and popliteal arteries as well as venous reflux as the greater  saphenous veins bilaterally.  We did discuss his vascular disease and he has requested a referral to a vascular surgeon, Dr. Felix Drake (05/23 @ 12:30).  He also does not have any wound for cardiovascular, so we did discuss a referral to Dr. Reyes while he is at Dr. Drake's office.     Additionally, we reviewed his DNA culture which was obtained on 05/10/2023.  It is positive for multiple pathogens the most prevalent of which is enterococcus at 90%.  We will be requesting a recommendation for topical antibiotics through [facility] and he has been prescribed doxycycline 100 mg b.i.d. times 10 days.  He was educated on the vascular system and encouraged to, for the time being, avoid any constriction on the lower extremities cleared by Dr. Drake.  For the time being we will use silver alginate in a Kerlix wrap.  He has used Hibiclens x 3 days as requested and his legs do appear somewhat improved.  He will return to clinic in 1 week.      Review of Systems      Objective:      Vitals:    05/17/23 0802   BP: (!) 185/79   Pulse: 71   Resp: 18       Physical Exam       Altered Skin Integrity 05/10/23 1001 Right anterior;lower Leg #1 (Active)   05/10/23 1001   Altered Skin Integrity Present on Admission - Did Patient arrive to the hospital with altered skin?: yes   Side: Right   Orientation: anterior;lower   Location: Leg   Wound Number: #1   Is this injury device related?:    Primary Wound Type:    Description of Altered Skin Integrity:    Ankle-Brachial Index:    Pulses:    Removal Indication and Assessment:    Wound Outcome:    (Retired) Wound Length (cm):    (Retired) Wound Width (cm):    (Retired) Depth (cm):    Wound Description (Comments):    Removal Indications:    Dressing Appearance Moist drainage 05/17/23 0803   Drainage Amount Moderate 05/17/23 0803   Drainage Characteristics/Odor Serosanguineous 05/17/23 0803   Appearance Moist;Pink 05/17/23 0803   Tissue loss description Full thickness 05/17/23 0803    Periwound Area Intact;Moist 05/17/23 0803   Wound Edges Open 05/17/23 0803   Wound Length (cm) 6.4 cm 05/17/23 0803   Wound Width (cm) 4 cm 05/17/23 0803   Wound Depth (cm) 0.2 cm 05/17/23 0803   Wound Volume (cm^3) 5.12 cm^3 05/17/23 0803   Wound Surface Area (cm^2) 25.6 cm^2 05/17/23 0803            Altered Skin Integrity 05/10/23 1001 Left anterior;lower Leg #2 (Active)   05/10/23 1001   Altered Skin Integrity Present on Admission - Did Patient arrive to the hospital with altered skin?: yes   Side: Left   Orientation: anterior;lower   Location: Leg   Wound Number: #2   Is this injury device related?:    Primary Wound Type:    Description of Altered Skin Integrity:    Ankle-Brachial Index:    Pulses:    Removal Indication and Assessment:    Wound Outcome:    (Retired) Wound Length (cm):    (Retired) Wound Width (cm):    (Retired) Depth (cm):    Wound Description (Comments):    Removal Indications:    Dressing Appearance Moist drainage 05/17/23 0803   Drainage Amount Moderate 05/17/23 0803   Drainage Characteristics/Odor Serosanguineous 05/17/23 0803   Appearance Moist;Pink 05/17/23 0803   Tissue loss description Full thickness 05/17/23 0803   Periwound Area Intact 05/17/23 0803   Wound Edges Open 05/17/23 0803   Wound Length (cm) 2.5 cm 05/17/23 0803   Wound Width (cm) 3.4 cm 05/17/23 0803   Wound Depth (cm) 0.2 cm 05/17/23 0803   Wound Volume (cm^3) 1.7 cm^3 05/17/23 0803   Wound Surface Area (cm^2) 8.5 cm^2 05/17/23 0803         Right lower leg     Left lower leg        Bilateral - silver alginate, 6x6 gentle foam border dressing  CT      Assessment:         ICD-10-CM ICD-9-CM   1. Non-pressure chronic ulcer of left lower leg, limited to breakdown of skin  L97.921 707.10   2. Non-pressure chronic ulcer right lower leg, limited to breakdown skin  L97.911 707.10   3. Peripheral vascular disease  I73.9 443.9   4. Arterial insufficiency of lower extremity  I73.9 443.9   5. Venous reflux  I87.2 459.81   6. Diabetic  autonomic neuropathy associated with type 2 diabetes mellitus  E11.43 250.60     337.1   7. Diabetic polyneuropathy associated with type 2 diabetes mellitus  E11.42 250.60     357.2           Procedures:     Mechanical debridement only    [] Yes [] No   I & D performed  [] Yes [] No   Excisional debridement performed  [] Yes [] No   Selective debridement performed           [x] Yes [] No   Mechanical debridement performed         [] Yes [] No  Silver nitrate applied                                     [] Yes [] No  Labs ordered this visit                                  [] Yes [] No   Imaging ordered this visit                           [] Yes [] No   Tissue pathology and/or culture taken         MEDICATIONS    Current Outpatient Medications:     ARIPiprazole (ABILIFY) 15 MG Tab, TAKE 1 TABLET BY MOUTH EVERY DAY AT BEDTIME FOR 90 DAYS, Disp: , Rfl:     atorvastatin (LIPITOR) 40 MG tablet, Take 40 mg by mouth., Disp: , Rfl:     benztropine (COGENTIN) 0.5 MG tablet, Take 0.5 mg by mouth every evening., Disp: , Rfl:     diltiaZEM (CARDIZEM) 120 MG tablet, Take 120 mg by mouth., Disp: , Rfl:     furosemide (LASIX) 20 MG tablet, Take 20 mg by mouth., Disp: , Rfl:     glimepiride (AMARYL) 4 MG tablet, Take 4 mg by mouth 2 (two) times daily., Disp: , Rfl:     HUMALOG KWIKPEN INSULIN 100 unit/mL pen, INJECT 15 UNITS SUBCUTANEOUSLY THREE TIMES DAILY, Disp: , Rfl:     hydrALAZINE (APRESOLINE) 50 MG tablet, Take 50 mg by mouth 3 (three) times daily., Disp: , Rfl:     LANTUS SOLOSTAR U-100 INSULIN glargine 100 units/mL SubQ pen, SMARTSIG:15 Unit(s) SUB-Q Daily, Disp: , Rfl:     levothyroxine (SYNTHROID) 25 MCG tablet, Take 25 mcg by mouth., Disp: , Rfl:     metFORMIN (GLUCOPHAGE) 1000 MG tablet, Take 1,000 mg by mouth 2 (two) times daily., Disp: , Rfl:     mupirocin (BACTROBAN) 2 % ointment, Apply topically 3 (three) times daily., Disp: , Rfl:     olmesartan (BENICAR) 40 MG tablet, Take 40 mg by mouth., Disp: , Rfl:      doxycycline (VIBRA-TABS) 100 MG tablet, Take 1 tablet (100 mg total) by mouth 2 (two) times daily. for 10 days, Disp: 20 tablet, Rfl: 0   Review of patient's allergies indicates:   Allergen Reactions    Sulfa (sulfonamide antibiotics)      Other reaction(s): itching       DIAGNOSTICS      Labs/Scans/Micro:    CBC:   Lab Results   Component Value Date    WBC 8.6 05/11/2018    HGB 13.8 (L) 05/11/2018    HCT 40.0 (L) 05/11/2018    MCV 86 05/11/2018     (H) 05/11/2018       Sedimentation rate:   Lab Results   Component Value Date    SEDRATE 45 (H) 11/27/2017      C-reactive protein: No results found for: CRP Chemistry: [unfilled]  HBA1C: No components found for: HBA1C    Ankle Brachial Index: No results found for this or any previous visit.      Imaging:    Plain film: No results found for this or any previous visit.    MRI: No results found for this or any previous visit.   No results found for this or any previous visit.    Bone Scan: No results found for this or any previous visit.   No results found for this or any previous visit.      Vascular studies: 05/15:    FINDINGS - arterial   Ultrasound Doppler and color flow imaging were performed on the arteries of the lower extremities bilaterally. There is extensive scattered plaque formation throughout the superficial and popliteal arteries bilaterally resulting in stenosis estimated at 50-70%.  A biphasic flow wave pattern is noted to the common femoral, superficial femoral, popliteal, anterior tibial, posterior tibial, dorsalis pedis arteries bilaterally.   Impression:   1. Extensive plaque formation at the superficial at the popliteal arteries bilaterally resulting in stenosis estimated at 50-75%.     FINDINGS - venous  There is normal compression and flow noted in the  common femoral vein, superficial femoral vein, popliteal vein, and  posterior tibial veinbilaterally.  No evidence of deep venous thrombosis is identified.  There is bilateral reflux at  the greater saphenous veins.  Reflux time on the right is 1131 milliseconds and reflux time on the left is 987 milliseconds   Impression:   1.  No deep venous thrombosis identified to the lower extremities bilaterally   2.  Venous reflux at the greater saphenous veins bilaterally.  Microbiology: Cx obtained 5/10/23    HOME [facility]ALTH AGENCY:  [facility]  TIMES PER WEEK/DAYS:  1 x weekly  WOUND CARE ORDERS:    Right anterior lower leg wound: Cleanse with wound cleanser, apply silver alginate, and gentle foam border dressing to be changed daily. *No compression*  Left anterior lower leg wound: Cleanse with wound cleanser, apply silver alginate, and gentle foam border dressing to be changed daily. *No compression*    Follow up in 1 week (on 5/24/2023) for bilateral lower legs .        Electronically signed:  Sarah Rodriguez NP

## 2023-05-18 ENCOUNTER — DOCUMENTATION ONLY (OUTPATIENT)
Dept: WOUND CARE | Facility: HOSPITAL | Age: 78
End: 2023-05-18
Payer: MEDICARE

## 2023-05-24 ENCOUNTER — HOSPITAL ENCOUNTER (OUTPATIENT)
Dept: WOUND CARE | Facility: HOSPITAL | Age: 78
Discharge: HOME | End: 2023-05-24
Attending: NURSE PRACTITIONER
Payer: MEDICARE

## 2023-05-24 VITALS
HEIGHT: 75 IN | RESPIRATION RATE: 18 BRPM | HEART RATE: 72 BPM | BODY MASS INDEX: 31.08 KG/M2 | SYSTOLIC BLOOD PRESSURE: 161 MMHG | WEIGHT: 250 LBS | DIASTOLIC BLOOD PRESSURE: 74 MMHG

## 2023-05-24 DIAGNOSIS — L97.921 NON-PRESSURE CHRONIC ULCER OF LEFT LOWER LEG, LIMITED TO BREAKDOWN OF SKIN: Primary | ICD-10-CM

## 2023-05-24 DIAGNOSIS — E11.43 DIABETIC AUTONOMIC NEUROPATHY ASSOCIATED WITH TYPE 2 DIABETES MELLITUS: ICD-10-CM

## 2023-05-24 DIAGNOSIS — I87.2 VENOUS REFLUX: ICD-10-CM

## 2023-05-24 DIAGNOSIS — E11.42 DIABETIC POLYNEUROPATHY ASSOCIATED WITH TYPE 2 DIABETES MELLITUS: ICD-10-CM

## 2023-05-24 DIAGNOSIS — I73.9 ARTERIAL INSUFFICIENCY OF LOWER EXTREMITY: ICD-10-CM

## 2023-05-24 DIAGNOSIS — I73.9 PERIPHERAL VASCULAR DISEASE: ICD-10-CM

## 2023-05-24 DIAGNOSIS — L97.911 NON-PRESSURE CHRONIC ULCER RIGHT LOWER LEG, LIMITED TO BREAKDOWN SKIN: ICD-10-CM

## 2023-05-24 PROCEDURE — 27000999 HC MEDICAL RECORD PHOTO DOCUMENTATION

## 2023-05-24 PROCEDURE — 97597 DBRDMT OPN WND 1ST 20 CM/<: CPT

## 2023-05-24 PROCEDURE — 99212 OFFICE O/P EST SF 10 MIN: CPT | Mod: 25

## 2023-05-24 RX ORDER — CLOPIDOGREL BISULFATE 75 MG/1
TABLET ORAL
COMMUNITY
Start: 2023-05-23

## 2023-05-24 NOTE — PROCEDURES
"Debridement    Date/Time: 5/24/2023 8:17 AM  Performed by: Sarah Rodriguez NP  Authorized by: Sarah Rodriguez NP     Time out: Immediately prior to procedure a "time out" was called to verify the correct patient, procedure, equipment, support staff and site/side marked as required.    Consent Done?:  Yes (Verbal)    Preparation: Patient was prepped and draped with clean technique    Local anesthesia used?: No      Wound Details:    Location:  Right leg    Type of Debridement:  Non-excisional       Length (cm):  13       Area (sq cm):  188.5       Width (cm):  14.5       Percent Debrided (%):  5       Depth (cm):  0.5       Total Area Debrided (sq cm):  9.43    Depth of debridement:  Epidermis/Dermis    Devitalized tissue debrided:  Biofilm, Exudate, Fibrin and Slough    Instruments:  Forceps and Scissors  Bleeding:  None    2nd Wound Details:     Location:  Left leg    Type of Debridement:  Non-excisional       Length (cm):  11       Area (sq cm):  115.5       Width (cm):  10.5       Percent Debrided (%):  5       Depth (cm):  0.2       Total Area Debrided (sq cm):  5.78    Depth of debridement:  Epidermis/Dermis    Devitalized tissue debrided:  Biofilm, Fibrin and Slough    Instruments:  Forceps and Scissors  Patient tolerance:  Patient tolerated the procedure well with no immediate complications     ML  "

## 2023-05-24 NOTE — PROGRESS NOTES
Subjective:       Patient ID: Drew Oliva is a 77 y.o. male.    Chief Complaint: Arterial Wound Follow Up (Bilateral lower legs )    HPI    5/10/23 - Mr. Oliva was referred to clinic by his PCP, Dr. Kaur.  The patient reports that in March, 2023, he tripped and fell at home and broke to ribs.  He healed from that situation, however, shortly afterwards he noticed that his lower legs began to swell and blisters began developing bilaterally.  He went to Dr. Kaur who felt that perhaps it was impact ago.  He was given Keflex in early April.  The wounds did not heal so the patient returned to Dr. Spears on 05/04/2023 and at that time he was referred to wound clinic.    He is a diabetic.  His last A1c was 6.6 in January, 2023.  His blood sugar today was 221.  A [facility] Dragan was performed today with 0/10 sensation.  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  Doppler U/S were done with weak petal pulses, and unable to obtain posterior tibial pulses.  The patient has been referred for Cardiovascular U/S which will be done on 5/15/23 and 11:30 am.  He has a history of renal U/S, however, he has no cardiovascular history noted.    His legs were assessed today.  There are scattered open wounds at the lower bilateral legs.  A culture was obtained from the wounds on the right lower extremity.  He was given Hibiclens to wash the legs x 3 days and we will use silver alginate until the culture and U/S are reviewed.  He does see podiatrist, Dr. Mehta, in [place] for a large callus at the right great toe metatarsal head and for toe nail clipping.     5/17/23 - Mr. Oliva returns today for follow up on the bilateral lower extremity wounds.  He did have U/S done on 5/10/23 (results below) which show mixed peripheral vascular disease with bilateral stenosis of 50-75% throughout the superficial and popliteal arteries as well as venous reflux as the greater saphenous veins  bilaterally.  We did discuss his vascular disease and he has requested a referral to a vascular surgeon, Dr. Felix Drake (05/23 @ 12:30).  He also does not have any wound for cardiovascular, so we did discuss a referral to Dr. Reyes while he is at Dr. Drake's office.     Additionally, we reviewed his DNA culture which was obtained on 05/10/2023.  It is positive for multiple pathogens the most prevalent of which is enterococcus at 90%.  We will be requesting a recommendation for topical antibiotics through [facility] and he has been prescribed doxycycline 100 mg b.i.d. times 10 days.  He was educated on the vascular system and encouraged to, for the time being, avoid any constriction on the lower extremities cleared by Dr. Drake.  For the time being we will use silver alginate in a Kerlix wrap.  He has used Hibiclens x 3 days as requested and his legs do appear somewhat improved.  He will return to clinic in 1 week.    5/24/23 - The patient returns today for f/up on his bilateral lower extremity wounds.  He was seen by Dr. Drake 5/24/23, started on Plavix.  He will have his first procedure done on the right leg on Wednesday, 5/31/23.  We are unable to compress at this time until he is cleared.  Today the wounds were selectively debrided.  There is an increased number of blisters that have developed since his last visit.  The legs will be wrapped lightly with kerlix.  Additionally his topical abx were ordered last week.  They were not at clinic at the time of his appointment, however, he did pick those up by early afternoon.  He was educated on their use at the time of his visit.  He also continues to take his Doxy as ordered.       Review of Systems      Objective:      Vitals:    05/24/23 0852   BP: (!) 161/74   Pulse: 72   Resp: 18       Physical Exam       Altered Skin Integrity 05/10/23 1001 Right lower Leg #1 (Active)   05/10/23 1001   Altered Skin Integrity Present on Admission - Did Patient arrive to  the hospital with altered skin?: yes   Side: Right   Orientation: lower   Location: Leg   Wound Number: #1   Is this injury device related?:    Primary Wound Type:    Description of Altered Skin Integrity:    Ankle-Brachial Index:    Pulses:    Removal Indication and Assessment:    Wound Outcome:    (Retired) Wound Length (cm):    (Retired) Wound Width (cm):    (Retired) Depth (cm):    Wound Description (Comments):    Removal Indications:    Dressing Appearance Moist drainage 05/24/23 0852   Drainage Amount Moderate 05/24/23 0852   Drainage Characteristics/Odor Serosanguineous 05/24/23 0852   Appearance Pink;Moist 05/24/23 0852   Tissue loss description Full thickness 05/24/23 0852   Periwound Area Redness 05/24/23 0852   Wound Edges Open 05/24/23 0852   Wound Length (cm) 13 cm 05/24/23 0852   Wound Width (cm) 14.5 cm 05/24/23 0852   Wound Depth (cm) 0.5 cm 05/24/23 0852   Wound Volume (cm^3) 94.25 cm^3 05/24/23 0852   Wound Surface Area (cm^2) 188.5 cm^2 05/24/23 0852   Care Cleansed with:;Wound cleanser 05/24/23 0852   Dressing Applied 05/24/23 0852   Dressing Change Due 05/25/23 05/24/23 0852            Altered Skin Integrity 05/10/23 1001 Left lower Leg #2 (Active)   05/10/23 1001   Altered Skin Integrity Present on Admission - Did Patient arrive to the hospital with altered skin?: yes   Side: Left   Orientation: lower   Location: Leg   Wound Number: #2   Is this injury device related?:    Primary Wound Type:    Description of Altered Skin Integrity:    Ankle-Brachial Index:    Pulses:    Removal Indication and Assessment:    Wound Outcome:    (Retired) Wound Length (cm):    (Retired) Wound Width (cm):    (Retired) Depth (cm):    Wound Description (Comments):    Removal Indications:    Dressing Appearance Moist drainage 05/24/23 0852   Drainage Amount Moderate 05/24/23 0852   Drainage Characteristics/Odor Serosanguineous 05/24/23 0852   Appearance Pink;Moist 05/24/23 0852   Tissue loss description Full thickness  "05/24/23 0852   Periwound Area Redness 05/24/23 0852   Wound Edges Open 05/24/23 0852   Wound Length (cm) 11 cm 05/24/23 0852   Wound Width (cm) 10.5 cm 05/24/23 0852   Wound Depth (cm) 0.2 cm 05/24/23 0852   Wound Volume (cm^3) 23.1 cm^3 05/24/23 0852   Wound Surface Area (cm^2) 115.5 cm^2 05/24/23 0852   Care Cleansed with:;Wound cleanser 05/24/23 0852   Dressing Applied 05/24/23 0852   Dressing Change Due 05/25/23 05/24/23 0852         Right lower leg          Left lower leg        STACIE, silver alginate, kerlix, tape, coban  ML      Assessment:         ICD-10-CM ICD-9-CM   1. Non-pressure chronic ulcer of left lower leg, limited to breakdown of skin  L97.921 707.10   2. Non-pressure chronic ulcer right lower leg, limited to breakdown skin  L97.911 707.10   3. Peripheral vascular disease  I73.9 443.9   4. Arterial insufficiency of lower extremity  I73.9 443.9   5. Venous reflux  I87.2 459.81   6. Diabetic autonomic neuropathy associated with type 2 diabetes mellitus  E11.43 250.60     337.1   7. Diabetic polyneuropathy associated with type 2 diabetes mellitus  E11.42 250.60     357.2           Procedures:     Debridement     Date/Time: 5/24/2023 8:17 AM  Performed by: Sarah Rodriguez NP  Authorized by: Sarah Rodriguez NP      Time out: Immediately prior to procedure a "time out" was called to verify the correct patient, procedure, equipment, support staff and site/side marked as required.     Consent Done?:  Yes (Verbal)     Preparation: Patient was prepped and draped with clean technique    Local anesthesia used?: No       Wound Details:    Location:  Right leg    Type of Debridement:  Non-excisional       Length (cm):  13       Area (sq cm):  188.5       Width (cm):  14.5       Percent Debrided (%):  5       Depth (cm):  0.5       Total Area Debrided (sq cm):  9.43    Depth of debridement:  Epidermis/Dermis    Devitalized tissue debrided:  Biofilm, Exudate, Fibrin and Slough    Instruments:  Forceps and " Scissors  Bleeding:  None     2nd Wound Details:     Location:  Left leg    Type of Debridement:  Non-excisional       Length (cm):  11       Area (sq cm):  115.5       Width (cm):  10.5       Percent Debrided (%):  5       Depth (cm):  0.2       Total Area Debrided (sq cm):  5.78    Depth of debridement:  Epidermis/Dermis    Devitalized tissue debrided:  Biofilm, Fibrin and Slough    Instruments:  Forceps and Scissors  Patient tolerance:  Patient tolerated the procedure well with no immediate complications      ML    [] Yes [] No   I & D performed  [] Yes [] No   Excisional debridement performed  [x] Yes [] No   Selective debridement performed           [] Yes [] No   Mechanical debridement performed         [] Yes [] No  Silver nitrate applied                                     [] Yes [] No  Labs ordered this visit                                  [] Yes [] No   Imaging ordered this visit                           [] Yes [] No   Tissue pathology and/or culture taken         MEDICATIONS    Current Outpatient Medications:     ARIPiprazole (ABILIFY) 15 MG Tab, TAKE 1 TABLET BY MOUTH EVERY DAY AT BEDTIME FOR 90 DAYS, Disp: , Rfl:     atorvastatin (LIPITOR) 40 MG tablet, Take 40 mg by mouth., Disp: , Rfl:     benztropine (COGENTIN) 0.5 MG tablet, Take 0.5 mg by mouth every evening., Disp: , Rfl:     clopidogreL (PLAVIX) 75 mg tablet, , Disp: , Rfl:     diltiaZEM (CARDIZEM) 120 MG tablet, Take 120 mg by mouth., Disp: , Rfl:     doxycycline (VIBRA-TABS) 100 MG tablet, Take 1 tablet (100 mg total) by mouth 2 (two) times daily. for 10 days, Disp: 20 tablet, Rfl: 0    furosemide (LASIX) 20 MG tablet, Take 20 mg by mouth., Disp: , Rfl:     glimepiride (AMARYL) 4 MG tablet, Take 4 mg by mouth 2 (two) times daily., Disp: , Rfl:     HUMALOG KWIKPEN INSULIN 100 unit/mL pen, INJECT 15 UNITS SUBCUTANEOUSLY THREE TIMES DAILY, Disp: , Rfl:     hydrALAZINE (APRESOLINE) 50 MG tablet, Take 50 mg by mouth 3 (three) times daily., Disp:  , Rfl:     LANTUS SOLOSTAR U-100 INSULIN glargine 100 units/mL SubQ pen, SMARTSIG:15 Unit(s) SUB-Q Daily, Disp: , Rfl:     levothyroxine (SYNTHROID) 25 MCG tablet, Take 25 mcg by mouth., Disp: , Rfl:     metFORMIN (GLUCOPHAGE) 1000 MG tablet, Take 1,000 mg by mouth 2 (two) times daily., Disp: , Rfl:     mupirocin (BACTROBAN) 2 % ointment, Apply topically 3 (three) times daily., Disp: , Rfl:     olmesartan (BENICAR) 40 MG tablet, Take 40 mg by mouth., Disp: , Rfl:    Review of patient's allergies indicates:   Allergen Reactions    Sulfa (sulfonamide antibiotics)      Other reaction(s): itching       DIAGNOSTICS      Labs/Scans/Micro:    CBC:   Lab Results   Component Value Date    WBC 8.6 05/11/2018    HGB 13.8 (L) 05/11/2018    HCT 40.0 (L) 05/11/2018    MCV 86 05/11/2018     (H) 05/11/2018       Sedimentation rate:   Lab Results   Component Value Date    SEDRATE 45 (H) 11/27/2017      C-reactive protein: No results found for: CRP Chemistry: [unfilled]  HBA1C: No components found for: HBA1C    Ankle Brachial Index: No results found for this or any previous visit.      Imaging:    Plain film: No results found for this or any previous visit.    MRI: No results found for this or any previous visit.   No results found for this or any previous visit.    Bone Scan: No results found for this or any previous visit.   No results found for this or any previous visit.      Vascular studies: 05/15:    FINDINGS - arterial   Ultrasound Doppler and color flow imaging were performed on the arteries of the lower extremities bilaterally. There is extensive scattered plaque formation throughout the superficial and popliteal arteries bilaterally resulting in stenosis estimated at 50-70%.  A biphasic flow wave pattern is noted to the common femoral, superficial femoral, popliteal, anterior tibial, posterior tibial, dorsalis pedis arteries bilaterally.   Impression:   1. Extensive plaque formation at the superficial at the  popliteal arteries bilaterally resulting in stenosis estimated at 50-75%.     FINDINGS - venous  There is normal compression and flow noted in the  common femoral vein, superficial femoral vein, popliteal vein, and  posterior tibial veinbilaterally.  No evidence of deep venous thrombosis is identified.  There is bilateral reflux at the greater saphenous veins.  Reflux time on the right is 1131 milliseconds and reflux time on the left is 987 milliseconds   Impression:   1.  No deep venous thrombosis identified to the lower extremities bilaterally   2.  Venous reflux at the greater saphenous veins bilaterally.  Microbiology: Cx obtained 5/10/23    HOME [facility]ALTH [facility]:  [facility]  TIMES PER WEEK/DAYS:  1 x weekly  WOUND CARE ORDERS:    Bilateral lower leg wounds: Cleanse with wound cleanser and apply silver alginate to the wound bed, wrap legs in kerlix using tape to secure (coban can be used at very top of leg to help with kerlix staying up)  - to be changed daily. (HH to go on Monday's and Friday's) *No compression*  **If/when topical antibiotics are received: Apply topical abx/bassagel mixture to the wound bed, cover with silver alginate and wrap leg in kerlix using tape to secure - change daily**     Follow up in about 2 weeks (around 6/7/2023).        Electronically signed:  Sarah Rodriguez NP

## 2023-06-07 ENCOUNTER — HOSPITAL ENCOUNTER (OUTPATIENT)
Dept: WOUND CARE | Facility: HOSPITAL | Age: 78
Discharge: HOME | End: 2023-06-07
Attending: NURSE PRACTITIONER
Payer: MEDICARE

## 2023-06-07 VITALS
RESPIRATION RATE: 18 BRPM | BODY MASS INDEX: 31.08 KG/M2 | WEIGHT: 250 LBS | HEIGHT: 75 IN | HEART RATE: 68 BPM | DIASTOLIC BLOOD PRESSURE: 77 MMHG | SYSTOLIC BLOOD PRESSURE: 179 MMHG

## 2023-06-07 DIAGNOSIS — I73.9 ARTERIAL INSUFFICIENCY OF LOWER EXTREMITY: ICD-10-CM

## 2023-06-07 DIAGNOSIS — L97.911 NON-PRESSURE CHRONIC ULCER RIGHT LOWER LEG, LIMITED TO BREAKDOWN SKIN: ICD-10-CM

## 2023-06-07 DIAGNOSIS — E11.43 DIABETIC AUTONOMIC NEUROPATHY ASSOCIATED WITH TYPE 2 DIABETES MELLITUS: ICD-10-CM

## 2023-06-07 DIAGNOSIS — L97.921 NON-PRESSURE CHRONIC ULCER OF LEFT LOWER LEG, LIMITED TO BREAKDOWN OF SKIN: Primary | ICD-10-CM

## 2023-06-07 DIAGNOSIS — I73.9 PERIPHERAL VASCULAR DISEASE: ICD-10-CM

## 2023-06-07 DIAGNOSIS — I87.2 VENOUS REFLUX: ICD-10-CM

## 2023-06-07 DIAGNOSIS — E11.42 DIABETIC POLYNEUROPATHY ASSOCIATED WITH TYPE 2 DIABETES MELLITUS: ICD-10-CM

## 2023-06-07 PROCEDURE — 27000999 HC MEDICAL RECORD PHOTO DOCUMENTATION

## 2023-06-07 PROCEDURE — 99213 OFFICE O/P EST LOW 20 MIN: CPT

## 2023-06-07 RX ORDER — CEFIXIME 400 MG/1
CAPSULE ORAL
COMMUNITY
Start: 2023-05-17

## 2023-06-07 NOTE — PROGRESS NOTES
Subjective:       Patient ID: Drew Oliva is a 77 y.o. male.    Chief Complaint: Arterial Wound Follow Up (Right lower leg /Left lower leg )    HPI    5/10/23 - Mr. Oliva was referred to clinic by his PCP, Dr. Kaur.  The patient reports that in March, 2023, he tripped and fell at home and broke to ribs.  He healed from that situation, however, shortly afterwards he noticed that his lower legs began to swell and blisters began developing bilaterally.  He went to Dr. Kaur who felt that perhaps it was impact ago.  He was given Keflex in early April.  The wounds did not heal so the patient returned to Dr. Spears on 05/04/2023 and at that time he was referred to wound clinic.    He is a diabetic.  His last A1c was 6.6 in January, 2023.  His blood sugar today was 221.  A Fowler Dragan was performed today with 0/10 sensation.  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  Doppler U/S were done with weak petal pulses, and unable to obtain posterior tibial pulses.  The patient has been referred for Cardiovascular U/S which will be done on 5/15/23 and 11:30 am.  He has a history of renal U/S, however, he has no cardiovascular history noted.    His legs were assessed today.  There are scattered open wounds at the lower bilateral legs.  A culture was obtained from the wounds on the right lower extremity.  He was given Hibiclens to wash the legs x 3 days and we will use silver alginate until the culture and U/S are reviewed.  He does see podiatrist, Dr. Mehta, in [place] for a large callus at the right great toe metatarsal head and for toe nail clipping.     5/17/23 - Mr. Oliva returns today for follow up on the bilateral lower extremity wounds.  He did have U/S done on 5/10/23 (results below) which show mixed peripheral vascular disease with bilateral stenosis of 50-75% throughout the superficial and popliteal arteries as well as venous reflux as the greater saphenous veins  bilaterally.  We did discuss his vascular disease and he has requested a referral to a vascular surgeon, Dr. Felix Drake (05/23 @ 12:30).  He also does not have any wound for cardiovascular, so we did discuss a referral to Dr. Reyes while he is at Dr. Drake's office.     Additionally, we reviewed his DNA culture which was obtained on 05/10/2023.  It is positive for multiple pathogens the most prevalent of which is enterococcus at 90%.  We will be requesting a recommendation for topical antibiotics through [facility] and he has been prescribed doxycycline 100 mg b.i.d. times 10 days.  He was educated on the vascular system and encouraged to, for the time being, avoid any constriction on the lower extremities cleared by Dr. Drake.  For the time being we will use silver alginate in a Kerlix wrap.  He has used Hibiclens x 3 days as requested and his legs do appear somewhat improved.  He will return to clinic in 1 week.    5/24/23 - The patient returns today for f/up on his bilateral lower extremity wounds.  He was seen by Dr. Drake 5/24/23, started on Plavix.  He will have his first procedure done on the right leg on Wednesday, 5/31/23.  We are unable to compress at this time until he is cleared.  Today the wounds were selectively debrided.  There is an increased number of blisters that have developed since his last visit.  The legs will be wrapped lightly with kerlix.  Additionally his topical abx were ordered last week.  They were not at clinic at the time of his appointment, however, he did pick those up by early afternoon.  He was educated on their use at the time of his visit.  He also continues to take his Doxy as ordered.     6/7/23 - Mr. Oliva returns today for followup on the bilateral lower extremity wounds.  He did have a procedure done by Dr. Felix Drake on 5/31/23.  Dr. Orosco is note dated 05/23/2023 states that the patient has bilateral mixed wounds and will need bilateral staged arterial PTA  days.  The plan was to do the right in several days.  This some option is that the procedure that was performed on 05/31/2023 was that procedure, however, the patient states that he was told that there was no block edge in the right leg.  He also has a procedure scheduled on the left leg tomorrow, 06/08/2023.  He does have a new area of concern to the right leg appears to be a tape blister.  He also has a small area of concern at the right great toe metatarsal head.  He does have diabetes, however, he is followed by a podiatrist in [place].  A callus paring was done on this area today because his shoes were offer assessment.  There is an open wound that measures 0.4 x 0.4 x 0.2 cm.  We will continue to monitor, however, he prefers to have his podiatrist continue to follow that wound since he has been followed by him for several years.  He does have evidence of hammertoe and has had previous surgical intervention.  He continues to use topical abx (Cefepime) which was stared on 5/24/23.  We will d/c the topicals in one month, on or about 6/24/23 or his nearest appt.     Review of Systems      Objective:      Vitals:    06/07/23 0830   BP: (!) 179/77   Pulse: 68   Resp: 18       Physical Exam       Altered Skin Integrity 05/10/23 1001 Right lower Leg #1 (Active)   05/10/23 1001   Altered Skin Integrity Present on Admission - Did Patient arrive to the hospital with altered skin?: yes   Side: Right   Orientation: lower   Location: Leg   Wound Number: #1   Is this injury device related?:    Primary Wound Type:    Description of Altered Skin Integrity:    Ankle-Brachial Index:    Pulses:    Removal Indication and Assessment:    Wound Outcome:    (Retired) Wound Length (cm):    (Retired) Wound Width (cm):    (Retired) Depth (cm):    Wound Description (Comments):    Removal Indications:    Dressing Appearance Moist drainage 06/07/23 0832   Drainage Amount Moderate 06/07/23 0832   Drainage Characteristics/Odor  Serosanguineous 06/07/23 0832   Appearance Pink;Red;Tan;Moist 06/07/23 0832   Tissue loss description Full thickness 06/07/23 0832   Periwound Area [place];Dry;Redness 06/07/23 0832   Wound Edges Defined 06/07/23 0832   Wound Length (cm) 1.7 cm 06/07/23 0832   Wound Width (cm) 2 cm 06/07/23 0832   Wound Depth (cm) 0.2 cm 06/07/23 0832   Wound Volume (cm^3) 0.68 cm^3 06/07/23 0832   Wound Surface Area (cm^2) 3.4 cm^2 06/07/23 0832   Care Cleansed with:;Wound cleanser 06/07/23 0832   Dressing Applied 06/07/23 0832   Dressing Change Due 06/08/23 06/07/23 0832            Altered Skin Integrity 05/10/23 1001 Left lower Leg #2 (Active)   05/10/23 1001   Altered Skin Integrity Present on Admission - Did Patient arrive to the hospital with altered skin?: yes   Side: Left   Orientation: lower   Location: Leg   Wound Number: #2   Is this injury device related?:    Primary Wound Type:    Description of Altered Skin Integrity:    Ankle-Brachial Index:    Pulses:    Removal Indication and Assessment:    Wound Outcome:    (Retired) Wound Length (cm):    (Retired) Wound Width (cm):    (Retired) Depth (cm):    Wound Description (Comments):    Removal Indications:    Dressing Appearance Moist drainage 06/07/23 0832   Drainage Amount Moderate 06/07/23 0832   Drainage Characteristics/Odor Serosanguineous 06/07/23 0832   Appearance Pink;Red;Tan;Moist 06/07/23 0832   Tissue loss description Full thickness 06/07/23 0832   Periwound Area Dry;[place];Redness 06/07/23 0832   Wound Edges Defined 06/07/23 0832   Wound Length (cm) 9 cm 06/07/23 0832   Wound Width (cm) 8.5 cm 06/07/23 0832   Wound Depth (cm) 0.2 cm 06/07/23 0832   Wound Volume (cm^3) 15.3 cm^3 06/07/23 0832   Wound Surface Area (cm^2) 76.5 cm^2 06/07/23 0832   Care Cleansed with:;Wound cleanser 06/07/23 0832   Dressing Applied 06/07/23 0832   Dressing Change Due 06/08/23 06/07/23 0832           Right lower leg  silver alginate, 4x4, kerlix, coban        Left lower leg        silver  alginate, 4x4, kerlix, tape      Right great toe metatarsal head, callus paring  RC      Assessment:         ICD-10-CM ICD-9-CM   1. Non-pressure chronic ulcer of left lower leg, limited to breakdown of skin  L97.921 707.10   2. Non-pressure chronic ulcer right lower leg, limited to breakdown skin  L97.911 707.10   3. Peripheral vascular disease  I73.9 443.9   4. Arterial insufficiency of lower extremity  I73.9 443.9   5. Venous reflux  I87.2 459.81   6. Diabetic autonomic neuropathy associated with type 2 diabetes mellitus  E11.43 250.60     337.1   7. Diabetic polyneuropathy associated with type 2 diabetes mellitus  E11.42 250.60     357.2           Procedures:     Callus paring  Mechanical debridement only    [] Yes [] No   I & D performed  [] Yes [] No   Excisional debridement performed  [] Yes [] No   Selective debridement performed           [x] Yes [] No   Mechanical debridement performed         [] Yes [] No  Silver nitrate applied                                     [] Yes [] No  Labs ordered this visit                                  [] Yes [] No   Imaging ordered this visit                           [] Yes [] No   Tissue pathology and/or culture taken         MEDICATIONS    Current Outpatient Medications:     ARIPiprazole (ABILIFY) 15 MG Tab, TAKE 1 TABLET BY MOUTH EVERY DAY AT BEDTIME FOR 90 DAYS, Disp: , Rfl:     atorvastatin (LIPITOR) 40 MG tablet, Take 40 mg by mouth., Disp: , Rfl:     benztropine (COGENTIN) 0.5 MG tablet, Take 0.5 mg by mouth every evening., Disp: , Rfl:     cefixime (SUPRAX) 400 mg Cap, SPRINKLE CONTENTS OF TWO CAPSULES DIRECTLY ONTO INFECTION SITE ONCE DAILY, Disp: , Rfl:     clopidogreL (PLAVIX) 75 mg tablet, , Disp: , Rfl:     diltiaZEM (CARDIZEM) 120 MG tablet, Take 120 mg by mouth., Disp: , Rfl:     furosemide (LASIX) 20 MG tablet, Take 20 mg by mouth., Disp: , Rfl:     glimepiride (AMARYL) 4 MG tablet, Take 4 mg by mouth 2 (two) times daily., Disp: , Rfl:     HUMALOG KWIKPEN  INSULIN 100 unit/mL pen, INJECT 15 UNITS SUBCUTANEOUSLY THREE TIMES DAILY, Disp: , Rfl:     hydrALAZINE (APRESOLINE) 50 MG tablet, Take 50 mg by mouth 3 (three) times daily., Disp: , Rfl:     LANTUS SOLOSTAR U-100 INSULIN glargine 100 units/mL SubQ pen, SMARTSIG:15 Unit(s) SUB-Q Daily, Disp: , Rfl:     levothyroxine (SYNTHROID) 25 MCG tablet, Take 25 mcg by mouth., Disp: , Rfl:     metFORMIN (GLUCOPHAGE) 1000 MG tablet, Take 1,000 mg by mouth 2 (two) times daily., Disp: , Rfl:     mupirocin (BACTROBAN) 2 % ointment, Apply topically 3 (three) times daily., Disp: , Rfl:     olmesartan (BENICAR) 40 MG tablet, Take 40 mg by mouth., Disp: , Rfl:    Review of patient's allergies indicates:   Allergen Reactions    Sulfa (sulfonamide antibiotics)      Other reaction(s): itching       DIAGNOSTICS      Labs/Scans/Micro:    CBC:   Lab Results   Component Value Date    WBC 8.6 05/11/2018    HGB 13.8 (L) 05/11/2018    HCT 40.0 (L) 05/11/2018    MCV 86 05/11/2018     (H) 05/11/2018       Sedimentation rate:   Lab Results   Component Value Date    SEDRATE 45 (H) 11/27/2017      C-reactive protein: No results found for: CRP Chemistry: [unfilled]  HBA1C: No components found for: HBA1C    Ankle Brachial Index: No results found for this or any previous visit.      Imaging:    Plain film: No results found for this or any previous visit.    MRI: No results found for this or any previous visit.   No results found for this or any previous visit.    Bone Scan: No results found for this or any previous visit.   No results found for this or any previous visit.      Vascular studies: 05/15:    FINDINGS - arterial   Ultrasound Doppler and color flow imaging were performed on the arteries of the lower extremities bilaterally. There is extensive scattered plaque formation throughout the superficial and popliteal arteries bilaterally resulting in stenosis estimated at 50-70%.  A biphasic flow wave pattern is noted to the common  femoral, superficial femoral, popliteal, anterior tibial, posterior tibial, dorsalis pedis arteries bilaterally.   Impression:   1. Extensive plaque formation at the superficial at the popliteal arteries bilaterally resulting in stenosis estimated at 50-75%.     FINDINGS - venous  There is normal compression and flow noted in the  common femoral vein, superficial femoral vein, popliteal vein, and  posterior tibial veinbilaterally.  No evidence of deep venous thrombosis is identified.  There is bilateral reflux at the greater saphenous veins.  Reflux time on the right is 1131 milliseconds and reflux time on the left is 987 milliseconds   Impression:   1.  No deep venous thrombosis identified to the lower extremities bilaterally   2.  Venous reflux at the greater saphenous veins bilaterally.  Microbiology: Cx obtained 5/10/23    HOME HEALTH AGENCY:  [facility]  TIMES PER WEEK/DAYS:  1 x weekly  WOUND CARE ORDERS:    Left lower leg wounds: Cleanse with wound cleanser, apply topical abx/bassagel mixture to the wound bed, cover with silver alginate and wrap leg in kerlix using tape (cloth tape) to secure - change daily**   Right lower leg wounds:  Cleanse with wound cleanser, apply topical abx/bassagel mixture to the wound bed, cover with silver alginate and wrap leg in kerlix and coban, change daily**     Follow up in 1 week (on 6/14/2023) for BLE.        Electronically signed:  Sarah Rodriguez NP

## 2023-06-13 ENCOUNTER — HOSPITAL ENCOUNTER (OUTPATIENT)
Dept: WOUND CARE | Facility: HOSPITAL | Age: 78
Discharge: HOME | End: 2023-06-13
Attending: NURSE PRACTITIONER
Payer: MEDICARE

## 2023-06-13 VITALS
SYSTOLIC BLOOD PRESSURE: 181 MMHG | HEART RATE: 74 BPM | RESPIRATION RATE: 18 BRPM | DIASTOLIC BLOOD PRESSURE: 77 MMHG | WEIGHT: 250 LBS | BODY MASS INDEX: 31.08 KG/M2 | HEIGHT: 75 IN

## 2023-06-13 DIAGNOSIS — E11.43 DIABETIC AUTONOMIC NEUROPATHY ASSOCIATED WITH TYPE 2 DIABETES MELLITUS: ICD-10-CM

## 2023-06-13 DIAGNOSIS — L97.921 NON-PRESSURE CHRONIC ULCER OF LEFT LOWER LEG, LIMITED TO BREAKDOWN OF SKIN: Primary | ICD-10-CM

## 2023-06-13 DIAGNOSIS — E11.42 DIABETIC POLYNEUROPATHY ASSOCIATED WITH TYPE 2 DIABETES MELLITUS: ICD-10-CM

## 2023-06-13 DIAGNOSIS — I73.9 ARTERIAL INSUFFICIENCY OF LOWER EXTREMITY: ICD-10-CM

## 2023-06-13 DIAGNOSIS — L97.911 NON-PRESSURE CHRONIC ULCER RIGHT LOWER LEG, LIMITED TO BREAKDOWN SKIN: ICD-10-CM

## 2023-06-13 DIAGNOSIS — I73.9 PERIPHERAL VASCULAR DISEASE: ICD-10-CM

## 2023-06-13 DIAGNOSIS — I87.2 VENOUS REFLUX: ICD-10-CM

## 2023-06-13 PROCEDURE — 27000999 HC MEDICAL RECORD PHOTO DOCUMENTATION

## 2023-06-13 PROCEDURE — 97597 DBRDMT OPN WND 1ST 20 CM/<: CPT

## 2023-06-13 PROCEDURE — 99213 OFFICE O/P EST LOW 20 MIN: CPT | Mod: 25

## 2023-06-13 NOTE — PROGRESS NOTES
Subjective:       Patient ID: Drew Oliva is a 77 y.o. male.    Chief Complaint: Arterial Wound Follow Up (Right lower leg /Left lower leg / )    HPI    5/10/23 - Mr. Oliva was referred to clinic by his PCP, Dr. Kaur.  The patient reports that in March, 2023, he tripped and fell at home and broke to ribs.  He healed from that situation, however, shortly afterwards he noticed that his lower legs began to swell and blisters began developing bilaterally.  He went to Dr. Kaur who felt that perhaps it was impact ago.  He was given Keflex in early April.  The wounds did not heal so the patient returned to Dr. Spears on 05/04/2023 and at that time he was referred to wound clinic.    He is a diabetic.  His last A1c was 6.6 in January, 2023.  His blood sugar today was 221.  A [place] Dragan was performed today with 0/10 sensation.  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  Doppler U/S were done with weak petal pulses, and unable to obtain posterior tibial pulses.  The patient has been referred for Cardiovascular U/S which will be done on 5/15/23 and 11:30 am.  He has a history of renal U/S, however, he has no cardiovascular history noted.    His legs were assessed today.  There are scattered open wounds at the lower bilateral legs.  A culture was obtained from the wounds on the right lower extremity.  He was given Hibiclens to wash the legs x 3 days and we will use silver alginate until the culture and U/S are reviewed.  He does see podiatrist, Dr. Mehta, in [place] for a large callus at the right great toe metatarsal head and for toe nail clipping.     5/17/23 - Mr. Oliva returns today for follow up on the bilateral lower extremity wounds.  He did have U/S done on 5/10/23 (results below) which show mixed peripheral vascular disease with bilateral stenosis of 50-75% throughout the superficial and popliteal arteries as well as venous reflux as the greater saphenous  veins bilaterally.  We did discuss his vascular disease and he has requested a referral to a vascular surgeon, Dr. Felix Drake (05/23 @ 12:30).  He also does not have any wound for cardiovascular, so we did discuss a referral to Dr. Reyes while he is at Dr. Drake's office.     Additionally, we reviewed his DNA culture which was obtained on 05/10/2023.  It is positive for multiple pathogens the most prevalent of which is enterococcus at 90%.  We will be requesting a recommendation for topical antibiotics through [facility] and he has been prescribed doxycycline 100 mg b.i.d. times 10 days.  He was educated on the vascular system and encouraged to, for the time being, avoid any constriction on the lower extremities cleared by Dr. Drake.  For the time being we will use silver alginate in a Kerlix wrap.  He has used Hibiclens x 3 days as requested and his legs do appear somewhat improved.  He will return to clinic in 1 week.    5/24/23 - The patient returns today for f/up on his bilateral lower extremity wounds.  He was seen by Dr. Drake 5/24/23, started on Plavix.  He will have his first procedure done on the right leg on Wednesday, 5/31/23.  We are unable to compress at this time until he is cleared.  Today the wounds were selectively debrided.  There is an increased number of blisters that have developed since his last visit.  The legs will be wrapped lightly with kerlix.  Additionally his topical abx were ordered last week.  They were not at clinic at the time of his appointment, however, he did pick those up by early afternoon.  He was educated on their use at the time of his visit.  He also continues to take his Doxy as ordered.     6/7/23 - Mr. Oliva returns today for followup on the bilateral lower extremity wounds.  He did have a procedure done by Dr. Felix Drake on 5/31/23.  Dr. Drake's note dated 05/23/2023 states that the patient has bilateral mixed wounds and will need bilateral staged arterial  PTA days.  The plan was to do the right in several days.  This some option is that the procedure that was performed on 05/31/2023 was that procedure, however, the patient states that he was told that there was no blockage in the right leg.  He also has a procedure scheduled on the left leg tomorrow, 06/08/2023.  He does have a new area of concern to the right leg appears to be a tape blister.  He also has a small area of concern at the right great toe metatarsal head.  He does have diabetes, however, he is followed by a podiatrist in [place].  A callus paring was done on this area today because his shoes were offer assessment.  There is an open wound that measures 0.4 x 0.4 x 0.2 cm.  We will continue to monitor, however, he prefers to have his podiatrist continue to follow that wound since he has been followed by him for several years.  He does have evidence of hammertoe and has had previous surgical intervention.  He continues to use topical abx (Cefepime) which was stared on 5/24/23.  We will d/c the topicals in one month, on or about 6/24/23 or his nearest appt.     6/13/23 - the patient returns today for followup on his bilateral lower extremities.  He did have a right leg arterial procedure performed a proximally 2 weeks ago, right leg venous procedure last week was completed, and tomorrow he is scheduled to have a left leg venous procedure performed.  Dr. Felix Drake is his vascular surgeon.  He was told that the arteries in the left leg all looks good.  He continues to use the topical antibiotics (Cefepime) with bassa gel, and has used them since 5/24/23.  The topicals should be discontinued at his next visit if the wounds appear stable.  Today there was no increase in drainage and all wounds do appear to be drying up nicely with the use of the topicals.    Review of Systems      Objective:      Vitals:    06/13/23 1425   BP: (!) 181/77   Pulse: 74   Resp: 18       Physical Exam       Altered Skin Integrity  05/10/23 1001 Right lower Leg #1 (Active)   05/10/23 1001   Altered Skin Integrity Present on Admission - Did Patient arrive to the hospital with altered skin?: yes   Side: Right   Orientation: lower   Location: Leg   Wound Number: #1   Is this injury device related?:    Primary Wound Type:    Description of Altered Skin Integrity:    Ankle-Brachial Index:    Pulses:    Removal Indication and Assessment:    Wound Outcome:    (Retired) Wound Length (cm):    (Retired) Wound Width (cm):    (Retired) Depth (cm):    Wound Description (Comments):    Removal Indications:    Dressing Appearance Moist drainage 06/13/23 1435   Drainage Amount Moderate 06/13/23 1435   Drainage Characteristics/Odor Serosanguineous 06/13/23 1435   Appearance Pink;Moist 06/13/23 1435   Tissue loss description Full thickness 06/13/23 1435   Periwound Area Redness;Dry;[place] 06/13/23 1435   Wound Edges Defined 06/13/23 1435   Wound Length (cm) 0.8 cm 06/13/23 1435   Wound Width (cm) 1.1 cm 06/13/23 1435   Wound Depth (cm) 0.2 cm 06/13/23 1435   Wound Volume (cm^3) 0.176 cm^3 06/13/23 1435   Wound Surface Area (cm^2) 0.88 cm^2 06/13/23 1435   Care Cleansed with:;Wound cleanser 06/13/23 1435   Dressing Applied 06/13/23 1435   Dressing Change Due 06/14/23 06/13/23 1435            Altered Skin Integrity 05/10/23 1001 Left lower Leg #2 (Active)   05/10/23 1001   Altered Skin Integrity Present on Admission - Did Patient arrive to the hospital with altered skin?: yes   Side: Left   Orientation: lower   Location: Leg   Wound Number: #2   Is this injury device related?:    Primary Wound Type:    Description of Altered Skin Integrity:    Ankle-Brachial Index:    Pulses:    Removal Indication and Assessment:    Wound Outcome:    (Retired) Wound Length (cm):    (Retired) Wound Width (cm):    (Retired) Depth (cm):    Wound Description (Comments):    Removal Indications:    Dressing Appearance Moist drainage 06/13/23 1435   Drainage Amount Moderate 06/13/23 1435    Drainage Characteristics/Odor Serosanguineous 06/13/23 1435   Appearance Pink;Moist 06/13/23 1435   Tissue loss description Full thickness 06/13/23 1435   Periwound Area Redness;Dry;[place] 06/13/23 1435   Wound Edges Defined 06/13/23 1435   Wound Length (cm) 7.5 cm 06/13/23 1435   Wound Width (cm) 7.9 cm 06/13/23 1435   Wound Depth (cm) 0.2 cm 06/13/23 1435   Wound Volume (cm^3) 11.85 cm^3 06/13/23 1435   Wound Surface Area (cm^2) 59.25 cm^2 06/13/23 1435   Care Cleansed with:;Wound cleanser 06/13/23 1435   Dressing Applied 06/13/23 1435   Dressing Change Due 06/14/23 06/13/23 1435         Right lower leg, pre     post  calcium alginate, kerlix, coban (wife forgot topicals and bassa gel)          Left lower leg        calcium alginate, kerlix, tape (wife forgot topicals and bassa gel)      Right great toe metatarsal head, monitor  RC      Assessment:         ICD-10-CM ICD-9-CM   1. Non-pressure chronic ulcer of left lower leg, limited to breakdown of skin  L97.921 707.10   2. Non-pressure chronic ulcer right lower leg, limited to breakdown skin  L97.911 707.10   3. Peripheral vascular disease  I73.9 443.9   4. Arterial insufficiency of lower extremity  I73.9 443.9   5. Venous reflux  I87.2 459.81   6. Diabetic autonomic neuropathy associated with type 2 diabetes mellitus  E11.43 250.60     337.1   7. Diabetic polyneuropathy associated with type 2 diabetes mellitus  E11.42 250.60     357.2           Procedures:     Debridement     Date/Time: 6/13/2023 2:13 PM  Performed by: Sarah Rodriguez NP  Authorized by: Sarah Rodriguez NP      Consent Done?:  Yes (Verbal)     Preparation: Patient was prepped and draped with clean technique    Local anesthesia used?: No       Wound Details:    Location:  Right leg    Type of Debridement:  Non-excisional       Length (cm):  0.8       Area (sq cm):  0.88       Width (cm):  1.1       Percent Debrided (%):  100       Depth (cm):  0.2       Total Area Debrided (sq cm):  0.88     Depth of debridement:  Epidermis/Dermis    Devitalized tissue debrided:  Biofilm, Callus, Exudate and Fibrin    Instruments:  Forceps and Scissors  Bleeding:  None  Patient tolerance:  Patient tolerated the procedure well with no immediate complications      RC    [] Yes [] No   I & D performed  [] Yes [] No   Excisional debridement performed  [x] Yes [] No   Selective debridement performed           [] Yes [] No   Mechanical debridement performed         [] Yes [] No  Silver nitrate applied                                     [] Yes [] No  Labs ordered this visit                                  [] Yes [] No   Imaging ordered this visit                           [] Yes [] No   Tissue pathology and/or culture taken         MEDICATIONS    Current Outpatient Medications:     ARIPiprazole (ABILIFY) 15 MG Tab, TAKE 1 TABLET BY MOUTH EVERY DAY AT BEDTIME FOR 90 DAYS, Disp: , Rfl:     atorvastatin (LIPITOR) 40 MG tablet, Take 40 mg by mouth., Disp: , Rfl:     benztropine (COGENTIN) 0.5 MG tablet, Take 0.5 mg by mouth every evening., Disp: , Rfl:     cefixime (SUPRAX) 400 mg Cap, SPRINKLE CONTENTS OF TWO CAPSULES DIRECTLY ONTO INFECTION SITE ONCE DAILY, Disp: , Rfl:     clopidogreL (PLAVIX) 75 mg tablet, , Disp: , Rfl:     diltiaZEM (CARDIZEM) 120 MG tablet, Take 120 mg by mouth., Disp: , Rfl:     furosemide (LASIX) 20 MG tablet, Take 20 mg by mouth., Disp: , Rfl:     glimepiride (AMARYL) 4 MG tablet, Take 4 mg by mouth 2 (two) times daily., Disp: , Rfl:     HUMALOG KWIKPEN INSULIN 100 unit/mL pen, INJECT 15 UNITS SUBCUTANEOUSLY THREE TIMES DAILY, Disp: , Rfl:     hydrALAZINE (APRESOLINE) 50 MG tablet, Take 50 mg by mouth 3 (three) times daily., Disp: , Rfl:     LANTUS SOLOSTAR U-100 INSULIN glargine 100 units/mL SubQ pen, SMARTSIG:15 Unit(s) SUB-Q Daily, Disp: , Rfl:     levothyroxine (SYNTHROID) 25 MCG tablet, Take 25 mcg by mouth., Disp: , Rfl:     metFORMIN (GLUCOPHAGE) 1000 MG tablet, Take 1,000 mg by mouth 2 (two)  times daily., Disp: , Rfl:     mupirocin (BACTROBAN) 2 % ointment, Apply topically 3 (three) times daily., Disp: , Rfl:     olmesartan (BENICAR) 40 MG tablet, Take 40 mg by mouth., Disp: , Rfl:    Review of patient's allergies indicates:   Allergen Reactions    Sulfa (sulfonamide antibiotics)      Other reaction(s): itching       DIAGNOSTICS      Labs/Scans/Micro:    CBC:   Lab Results   Component Value Date    WBC 8.6 05/11/2018    HGB 13.8 (L) 05/11/2018    HCT 40.0 (L) 05/11/2018    MCV 86 05/11/2018     (H) 05/11/2018       Sedimentation rate:   Lab Results   Component Value Date    SEDRATE 45 (H) 11/27/2017      C-reactive protein: No results found for: CRP Chemistry: [unfilled]  HBA1C: No components found for: HBA1C    Ankle Brachial Index: No results found for this or any previous visit.      Imaging:    Plain film: No results found for this or any previous visit.    MRI: No results found for this or any previous visit.   No results found for this or any previous visit.    Bone Scan: No results found for this or any previous visit.   No results found for this or any previous visit.      Vascular studies: 05/15:    FINDINGS - arterial   Ultrasound Doppler and color flow imaging were performed on the arteries of the lower extremities bilaterally. There is extensive scattered plaque formation throughout the superficial and popliteal arteries bilaterally resulting in stenosis estimated at 50-70%.  A biphasic flow wave pattern is noted to the common femoral, superficial femoral, popliteal, anterior tibial, posterior tibial, dorsalis pedis arteries bilaterally.   Impression:   1. Extensive plaque formation at the superficial at the popliteal arteries bilaterally resulting in stenosis estimated at 50-75%.     FINDINGS - venous  There is normal compression and flow noted in the  common femoral vein, superficial femoral vein, popliteal vein, and  posterior tibial veinbilaterally.  No evidence of deep  venous thrombosis is identified.  There is bilateral reflux at the greater saphenous veins.  Reflux time on the right is 1131 milliseconds and reflux time on the left is 987 milliseconds   Impression:   1.  No deep venous thrombosis identified to the lower extremities bilaterally   2.  Venous reflux at the greater saphenous veins bilaterally.  Microbiology: Cx obtained 5/10/23    HOME HEALTH AGENCY:  [facility]  TIMES PER WEEK/DAYS:  1 x weekly  WOUND CARE ORDERS:    Left lower leg wounds: Cleanse with wound cleanser, apply topical abx/bassagel mixture to the wound bed, cover with silver alginate and wrap leg in kerlix using tape (cloth tape) to secure - change daily**   Right lower leg wounds:  Cleanse with wound cleanser, apply topical abx/bassagel mixture to the wound bed, cover with silver alginate and wrap leg in kerlix and coban, change daily**     Follow up in 1 week (on 6/20/2023) for BLE.        Electronically signed:  Sarah Rodriguez NP

## 2023-06-13 NOTE — PROCEDURES
Debridement    Date/Time: 6/13/2023 2:13 PM  Performed by: Sarah Rodriguez NP  Authorized by: Sarah Rodriguez NP     Consent Done?:  Yes (Verbal)    Preparation: Patient was prepped and draped with clean technique    Local anesthesia used?: No      Wound Details:    Location:  Right leg    Type of Debridement:  Non-excisional       Length (cm):  0.8       Area (sq cm):  0.88       Width (cm):  1.1       Percent Debrided (%):  100       Depth (cm):  0.2       Total Area Debrided (sq cm):  0.88    Depth of debridement:  Epidermis/Dermis    Devitalized tissue debrided:  Biofilm, Callus, Exudate and Fibrin    Instruments:  Forceps and Scissors  Bleeding:  None  Patient tolerance:  Patient tolerated the procedure well with no immediate complications     RC

## 2023-06-20 ENCOUNTER — HOSPITAL ENCOUNTER (OUTPATIENT)
Dept: WOUND CARE | Facility: HOSPITAL | Age: 78
Discharge: HOME | End: 2023-06-20
Attending: FAMILY MEDICINE
Payer: MEDICARE

## 2023-06-20 VITALS
RESPIRATION RATE: 17 BRPM | BODY MASS INDEX: 31.08 KG/M2 | DIASTOLIC BLOOD PRESSURE: 84 MMHG | HEIGHT: 75 IN | SYSTOLIC BLOOD PRESSURE: 189 MMHG | HEART RATE: 65 BPM | WEIGHT: 250 LBS

## 2023-06-20 DIAGNOSIS — L97.921 NON-PRESSURE CHRONIC ULCER OF LEFT LOWER LEG, LIMITED TO BREAKDOWN OF SKIN: Primary | ICD-10-CM

## 2023-06-20 DIAGNOSIS — L97.911 NON-PRESSURE CHRONIC ULCER RIGHT LOWER LEG, LIMITED TO BREAKDOWN SKIN: ICD-10-CM

## 2023-06-20 PROCEDURE — 99213 OFFICE O/P EST LOW 20 MIN: CPT

## 2023-06-20 PROCEDURE — 27000999 HC MEDICAL RECORD PHOTO DOCUMENTATION

## 2023-06-20 NOTE — PROGRESS NOTES
"   Subjective:       Patient ID: Drew Oliva is a 77 y.o. male.    Chief Complaint: Arterial Wound Follow Up (Right lower leg (HEALED)/Left lower leg )    77-year-old white male, who is here for follow up of bilateral leg arterial ulcers.  He recently had procedures done, including stents, and the wounds are just about healed.  There are no open wounds on the right leg.  He has a small wound on the left leg.    Review of Systems   Constitutional: Negative.    Respiratory: Negative.     Cardiovascular: Negative.    Skin:         As documented in the HPI.   All other systems reviewed and are negative.      Objective:      Vitals:    06/20/23 0920   BP: (!) 189/84   BP Location: Right arm   Patient Position: Sitting   Pulse: 65   Resp: 17   Weight: 113.4 kg (250 lb)   Height: 6' 3" (1.905 m)        Physical Exam  Vitals and nursing note reviewed. Exam conducted with a chaperone present.   Constitutional:       Appearance: Normal appearance.   Cardiovascular:      Rate and Rhythm: Normal rate.   Pulmonary:      Effort: Pulmonary effort is normal.   Skin:     General: Skin is warm and dry.      Comments: The right leg looks good, no open wounds.  The left leg has a small anterior superficial ulcer, which I did a mechanical debridement on.   Neurological:      Mental Status: He is alert.     Left lower leg    Right lower leg, healed    Right plantar 1st toe, plantar       Altered Skin Integrity 05/10/23 1001 Left lower Leg #2 (Active)   05/10/23 1001   Altered Skin Integrity Present on Admission - Did Patient arrive to the hospital with altered skin?: yes   Side: Left   Orientation: lower   Location: Leg   Wound Number: #2   Is this injury device related?:    Primary Wound Type:    Description of Altered Skin Integrity:    Ankle-Brachial Index:    Pulses:    Removal Indication and Assessment:    Wound Outcome:    (Retired) Wound Length (cm):    (Retired) Wound Width (cm):    (Retired) Depth (cm):    Wound Description " (Comments):    Removal Indications:    Dressing Appearance Moist drainage 06/20/23 0907   Drainage Amount Moderate 06/20/23 0907   Drainage Characteristics/Odor Serosanguineous 06/20/23 0907   Appearance Moist;Red;Granulating 06/20/23 0907   Tissue loss description Full thickness 06/20/23 0907   Periwound Area Intact 06/20/23 0907   Wound Edges Open 06/20/23 0907   Wound Length (cm) 7.5 cm 06/20/23 0907   Wound Width (cm) 3.5 cm 06/20/23 0907   Wound Depth (cm) 0.2 cm 06/20/23 0907   Wound Volume (cm^3) 5.25 cm^3 06/20/23 0907   Wound Surface Area (cm^2) 26.25 cm^2 06/20/23 0907   Care Cleansed with:;Wound cleanser 06/20/23 0907   Dressing Applied;Other (comment) 06/20/23 0907   Dressing Change Due 06/21/23 06/20/23 0907         Assessment:         ICD-10-CM ICD-9-CM   1. Non-pressure chronic ulcer of left lower leg, limited to breakdown of skin  L97.921 707.10   2. Non-pressure chronic ulcer right lower leg, limited to breakdown skin  L97.911 707.10         Procedures:   Excisional debridement performed:  [] Yes [x] No   Selective debridement performed:  [] Yes [x] No   Mechanical debridement performed:  [x] Yes [] No   Silver nitrate applied :    [] Yes [x] No   Labs ordered this visit:   [] Yes [x] No   Imaging ordered this visit:   [] Yes [x] No   Tissue pathology and/or culture taken:  [] Yes [x] No     Procedures:  HOME HEALTH AGENCY:  [facility] Team Member     Since 5/8/2023     [phone]     TIMES PER WEEK/DAYS:  ORDERS:  Left lower leg wound: Cleanse with wound cleanser, apply silver alginate, and island dressing to be changed daily     Patient Orders:  Follow up with Dr. rDake on 07/20/2023  D/C topical antibiotics today   Follows Dr. Mehta, podiatry in [place] for right plantar first toe wound                   Follow up in 2 weeks (on 7/4/2023) for left lower leg .

## 2023-07-03 ENCOUNTER — HOSPITAL ENCOUNTER (OUTPATIENT)
Dept: WOUND CARE | Facility: HOSPITAL | Age: 78
Discharge: HOME | End: 2023-07-03
Attending: FAMILY MEDICINE
Payer: MEDICARE

## 2023-07-03 VITALS
HEIGHT: 75 IN | DIASTOLIC BLOOD PRESSURE: 81 MMHG | BODY MASS INDEX: 31.08 KG/M2 | WEIGHT: 250 LBS | SYSTOLIC BLOOD PRESSURE: 192 MMHG | RESPIRATION RATE: 18 BRPM | HEART RATE: 69 BPM

## 2023-07-03 DIAGNOSIS — L97.921 NON-PRESSURE CHRONIC ULCER OF LEFT LOWER LEG, LIMITED TO BREAKDOWN OF SKIN: Primary | ICD-10-CM

## 2023-07-03 PROCEDURE — 27000999 HC MEDICAL RECORD PHOTO DOCUMENTATION

## 2023-07-03 PROCEDURE — 99213 OFFICE O/P EST LOW 20 MIN: CPT

## 2023-07-03 NOTE — PROGRESS NOTES
"Referred by: Dr. Kaur    Wound onset:  03/2023    Vascular Surgeon: Dr. Drake    Vascular procedures [] Yes [] No   If so, when and what was done? Bilateral stents 05/2023    Recent Ultrasounds [x] Yes [] No   If so, when were they done? 05/15/23    Doppler done in office? [x] Yes [] No                           []Monophasic []Biphasic []Triphasic     Is the patient eligible for a graft, and/or currently grafting? [] Yes [] No  If so, which one/size?      NOTES:  Topicals were d/c at last visit 6/20/23. Pt states that he scraped his right lower leg on Saturday 7/1/23, this area measures 2cm x 2cm x 0.2cm  Patients left leg is healed today, instructed to come back is symptoms worsen/change      Subjective:       Patient ID: Drew Oliva is a 77 y.o. male.    Chief Complaint: Arterial Wound Follow Up (Left lower leg / )    77-year-old white male, who is here for follow up of bilateral leg arterial ulcers.  He recently had procedures done, including stents, and the wounds are just about healed.  There are no open wounds on the right leg.  He has a small wound on the left leg.    Review of Systems   Constitutional: Negative.    Respiratory: Negative.     Cardiovascular: Negative.    Skin:         As documented in the HPI.   All other systems reviewed and are negative.      Objective:      Vitals:    07/03/23 0912   BP: (!) 192/81   BP Location: Right arm   Patient Position: Sitting   Pulse: 69   Resp: 18   Weight: 113.4 kg (250 lb)   Height: 6' 3" (1.905 m)        Physical Exam  Vitals and nursing note reviewed. Exam conducted with a chaperone present.   Constitutional:       Appearance: Normal appearance.   Cardiovascular:      Rate and Rhythm: Normal rate.   Pulmonary:      Effort: Pulmonary effort is normal.   Skin:     General: Skin is warm and dry.      Comments: The right leg looks good, no open wounds.  The left leg has a small anterior superficial ulcer, which I did a mechanical debridement on. "   Neurological:      Mental Status: He is alert.     Left lower leg    Right lower leg, healed    Right plantar 1st toe, plantar    7/3/23                   Left lower leg  (pre)                                                          right plantar 1st toe (monitor, pre)      Right lower medial leg (monitor, pre)      [REMOVED]      Altered Skin Integrity 05/10/23 1001 Left lower Leg #2 (Removed)   05/10/23 1001   Altered Skin Integrity Present on Admission - Did Patient arrive to the hospital with altered skin?: yes   Side: Left   Orientation: lower   Location: Leg   Wound Number: #2   Is this injury device related?:    Primary Wound Type:    Description of Altered Skin Integrity:    Ankle-Brachial Index:    Pulses:    Removal Indication and Assessment:    Wound Outcome: Healed   (Retired) Wound Length (cm):    (Retired) Wound Width (cm):    (Retired) Depth (cm):    Wound Description (Comments):    Removal Indications:    Removed 07/03/23 0931   Dressing Appearance Open to air 07/03/23 0931   Drainage Amount None 07/03/23 0931   Appearance Pink;Dry 07/03/23 0931   Periwound Area Intact;[place];Dry 07/03/23 0931   Wound Length (cm) 0 cm 07/03/23 0931   Wound Width (cm) 0 cm 07/03/23 0931   Wound Depth (cm) 0 cm 07/03/23 0931   Wound Volume (cm^3) 0 cm^3 07/03/23 0931   Wound Surface Area (cm^2) 0 cm^2 07/03/23 0931         Assessment:       No diagnosis found.        Procedures:   Excisional debridement performed:  [] Yes [x] No   Selective debridement performed:  [] Yes [x] No   Mechanical debridement performed:  [x] Yes [] No   Silver nitrate applied :    [] Yes [x] No   Labs ordered this visit:   [] Yes [x] No   Imaging ordered this visit:   [] Yes [x] No   Tissue pathology and/or culture taken:  [] Yes [x] No     Procedures:  HOME HEALTH AGENCY:  [facility] Team Member     Since 5/8/2023     [phone]     TIMES PER WEEK/DAYS:  ORDERS:  Left lower leg wound: keep clean and dry,  follow up if needed     Patient Orders:  Follow up with Dr. Drake on 07/20/2023  D/C topical antibiotics today   Follows Dr. Mehta, podiatry in [place] for right plantar first toe wound                   Follow up if symptoms worsen or fail to improve.

## 2023-08-17 ENCOUNTER — HOSPITAL ENCOUNTER (OUTPATIENT)
Dept: WOUND CARE | Facility: HOSPITAL | Age: 78
Discharge: HOME | End: 2023-08-17
Attending: NURSE PRACTITIONER
Payer: MEDICARE

## 2023-08-17 VITALS
HEART RATE: 77 BPM | BODY MASS INDEX: 31.08 KG/M2 | RESPIRATION RATE: 18 BRPM | SYSTOLIC BLOOD PRESSURE: 167 MMHG | DIASTOLIC BLOOD PRESSURE: 78 MMHG | WEIGHT: 250 LBS | HEIGHT: 75 IN

## 2023-08-17 DIAGNOSIS — L97.911 NON-PRESSURE CHRONIC ULCER RIGHT LOWER LEG, LIMITED TO BREAKDOWN SKIN: ICD-10-CM

## 2023-08-17 DIAGNOSIS — I73.9 PERIPHERAL VASCULAR DISEASE: ICD-10-CM

## 2023-08-17 DIAGNOSIS — E11.43 DIABETIC AUTONOMIC NEUROPATHY ASSOCIATED WITH TYPE 2 DIABETES MELLITUS: ICD-10-CM

## 2023-08-17 DIAGNOSIS — E11.42 DIABETIC POLYNEUROPATHY ASSOCIATED WITH TYPE 2 DIABETES MELLITUS: ICD-10-CM

## 2023-08-17 DIAGNOSIS — I87.2 VENOUS REFLUX: ICD-10-CM

## 2023-08-17 DIAGNOSIS — L97.921 NON-PRESSURE CHRONIC ULCER OF LEFT LOWER LEG, LIMITED TO BREAKDOWN OF SKIN: Primary | ICD-10-CM

## 2023-08-17 DIAGNOSIS — I73.9 ARTERIAL INSUFFICIENCY OF LOWER EXTREMITY: ICD-10-CM

## 2023-08-17 PROCEDURE — 99213 OFFICE O/P EST LOW 20 MIN: CPT

## 2023-08-17 PROCEDURE — 27000999 HC MEDICAL RECORD PHOTO DOCUMENTATION

## 2023-08-17 RX ORDER — AMOXICILLIN AND CLAVULANATE POTASSIUM 500; 125 MG/1; MG/1
1 TABLET, FILM COATED ORAL 2 TIMES DAILY
COMMUNITY
Start: 2023-08-11 | End: 2023-08-24

## 2023-08-17 RX ORDER — TAMSULOSIN HYDROCHLORIDE 0.4 MG/1
2 CAPSULE ORAL
COMMUNITY
Start: 2023-07-14

## 2023-08-17 NOTE — PROGRESS NOTES
Subjective:       Patient ID: Drew Oliva is a 78 y.o. male.    Chief Complaint: Arterial Wound Follow Up (Right lower leg /Left lower leg)    HPI    5/10/23 - Mr. Oliva was referred to clinic by his PCP, Dr. Kaur.  The patient reports that in March, 2023, he tripped and fell at home and broke to ribs.  He healed from that situation, however, shortly afterwards he noticed that his lower legs began to swell and blisters began developing bilaterally.  He went to Dr. Kaur who felt that perhaps it was impact ago.  He was given Keflex in early April.  The wounds did not heal so the patient returned to Dr. Spears on 05/04/2023 and at that time he was referred to wound clinic.    He is a diabetic.  His last A1c was 6.6 in January, 2023.  His blood sugar today was 221.  A Whitetail Dragan was performed today with 0/10 sensation.  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  Doppler U/S were done with weak petal pulses, and unable to obtain posterior tibial pulses.  The patient has been referred for Cardiovascular U/S which will be done on 5/15/23 and 11:30 am.  He has a history of renal U/S, however, he has no cardiovascular history noted.    His legs were assessed today.  There are scattered open wounds at the lower bilateral legs.  A culture was obtained from the wounds on the right lower extremity.  He was given Hibiclens to wash the legs x 3 days and we will use silver alginate until the culture and U/S are reviewed.  He does see podiatrist, Dr. Mehta, in [place] for a large callus at the right great toe metatarsal head and for toe nail clipping.     5/17/23 - Mr. Oliva returns today for follow up on the bilateral lower extremity wounds.  He did have U/S done on 5/10/23 (results below) which show mixed peripheral vascular disease with bilateral stenosis of 50-75% throughout the superficial and popliteal arteries as well as venous reflux as the greater saphenous veins  bilaterally.  We did discuss his vascular disease and he has requested a referral to a vascular surgeon, Dr. Felix Drake (05/23 @ 12:30).  He also does not have any wound for cardiovascular, so we did discuss a referral to Dr. Reyes while he is at Dr. Drake's office.     Additionally, we reviewed his DNA culture which was obtained on 05/10/2023.  It is positive for multiple pathogens the most prevalent of which is enterococcus at 90%.  We will be requesting a recommendation for topical antibiotics through [facility] and he has been prescribed doxycycline 100 mg b.i.d. times 10 days.  He was educated on the vascular system and encouraged to, for the time being, avoid any constriction on the lower extremities cleared by Dr. Drake.  For the time being we will use silver alginate in a Kerlix wrap.  He has used Hibiclens x 3 days as requested and his legs do appear somewhat improved.  He will return to clinic in 1 week.    5/24/23 - The patient returns today for f/up on his bilateral lower extremity wounds.  He was seen by Dr. Drake 5/24/23, started on Plavix.  He will have his first procedure done on the right leg on Wednesday, 5/31/23.  We are unable to compress at this time until he is cleared.  Today the wounds were selectively debrided.  There is an increased number of blisters that have developed since his last visit.  The legs will be wrapped lightly with kerlix.  Additionally his topical abx were ordered last week.  They were not at clinic at the time of his appointment, however, he did pick those up by early afternoon.  He was educated on their use at the time of his visit.  He also continues to take his Doxy as ordered.     6/7/23 - Mr. Oliva returns today for followup on the bilateral lower extremity wounds.  He did have a procedure done by Dr. Felix Drake on 5/31/23.  Dr. Drake's note dated 05/23/2023 states that the patient has bilateral mixed wounds and will need bilateral staged arterial PTA  days.  The plan was to do the right in several days.  This some option is that the procedure that was performed on 05/31/2023 was that procedure, however, the patient states that he was told that there was no blockage in the right leg.  He also has a procedure scheduled on the left leg tomorrow, 06/08/2023.  He does have a new area of concern to the right leg appears to be a tape blister.  He also has a small area of concern at the right great toe metatarsal head.  He does have diabetes, however, he is followed by a podiatrist in [place].  A callus paring was done on this area today because his shoes were offer assessment.  There is an open wound that measures 0.4 x 0.4 x 0.2 cm.  We will continue to monitor, however, he prefers to have his podiatrist continue to follow that wound since he has been followed by him for several years.  He does have evidence of hammertoe and has had previous surgical intervention.  He continues to use topical abx (Cefepime) which was stared on 5/24/23.  We will d/c the topicals in one month, on or about 6/24/23 or his nearest appt.     6/13/23 - the patient returns today for followup on his bilateral lower extremities.  He did have a right leg arterial procedure performed a proximally 2 weeks ago, right leg venous procedure last week was completed, and tomorrow he is scheduled to have a left leg venous procedure performed.  Dr. Felix Drake is his vascular surgeon.  He was told that the arteries in the left leg all looks good.  He continues to use the topical antibiotics (Cefepime) with bassa gel, and has used them since 5/24/23.  The topicals should be discontinued at his next visit if the wounds appear stable.  Today there was no increase in drainage and all wounds do appear to be drying up nicely with the use of the topicals.    8/17/23 - Mr. Oliva returns today for wounds on his bilateral lower extremity.  He was last seen at clinic on 07/03/2023 at that time he was discharged.   His called today after noticing areas that appeared to be blisters on his bilateral lower legs.  He has followed up with his vascular surgeon, Dr. Felix Drake in the last couple of weeks and is scheduled for followup with him in 6 months.    Additionally, he is currently taking p.o. Augmentin which was prescribed by his podiatrist, Dr. Mehta for a foot wound.  He sees Dr. Mehta on Thursdays.  Dr. Mehta will continue monitoring and managing that wound.  Today he has 2 very small open areas on the left lower extremity and 1 on the right lower extremity.  Go incidentally, the on the right and left are almost the same identifiable location at the front of the shin.  We discussed the possibility that he bumped into an object.  He is not sure if that occurred or not, however, the wife did see a blister on the very small 2nd wound on the left leg.  Today, we applied alginate and they will continue to use silver alginate daily and we will reassess in 1 week.  She was instructed to take photos if any blister should develop over the next week.    Review of Systems      Objective:      Vitals:    08/17/23 1435   BP: (!) 167/78   Pulse: 77   Resp: 18       Physical Exam       Altered Skin Integrity 08/17/23 1437 Right lower Leg #1 (Active)   08/17/23 1437   Altered Skin Integrity Present on Admission - Did Patient arrive to the hospital with altered skin?: yes   Side: Right   Orientation: lower   Location: Leg   Wound Number: #1   Is this injury device related?: No   Primary Wound Type:    Description of Altered Skin Integrity:    Ankle-Brachial Index:    Pulses:    Removal Indication and Assessment:    Wound Outcome:    (Retired) Wound Length (cm):    (Retired) Wound Width (cm):    (Retired) Depth (cm):    Wound Description (Comments):    Removal Indications:    Dressing Appearance Moist drainage 08/17/23 1436   Drainage Amount Moderate 08/17/23 1436   Drainage Characteristics/Odor Serosanguineous 08/17/23 1436    Appearance Moist;Red;Smooth 08/17/23 1436   Tissue loss description Full thickness 08/17/23 1436   Periwound Area Intact 08/17/23 1436   Wound Edges Open 08/17/23 1436   Wound Length (cm) 0.7 cm 08/17/23 1436   Wound Width (cm) 0.7 cm 08/17/23 1436   Wound Depth (cm) 0.2 cm 08/17/23 1436   Wound Volume (cm^3) 0.098 cm^3 08/17/23 1436   Wound Surface Area (cm^2) 0.49 cm^2 08/17/23 1436   Care Cleansed with:;Wound cleanser 08/17/23 1436   Dressing Applied;Other (comment) 08/17/23 1436   Dressing Change Due 08/18/23 08/17/23 1436            Altered Skin Integrity 08/17/23 1437 Left lower Leg #2 (Active)   08/17/23 1437   Altered Skin Integrity Present on Admission - Did Patient arrive to the hospital with altered skin?: yes   Side: Left   Orientation: lower   Location: Leg   Wound Number: #2   Is this injury device related?: No   Primary Wound Type:    Description of Altered Skin Integrity:    Ankle-Brachial Index:    Pulses:    Removal Indication and Assessment:    Wound Outcome:    (Retired) Wound Length (cm):    (Retired) Wound Width (cm):    (Retired) Depth (cm):    Wound Description (Comments):    Removal Indications:    Dressing Appearance Moist drainage 08/17/23 1436   Drainage Amount Moderate 08/17/23 1436   Drainage Characteristics/Odor Serosanguineous 08/17/23 1436   Appearance Moist;Red;Smooth 08/17/23 1436   Tissue loss description Full thickness 08/17/23 1436   Periwound Area Intact 08/17/23 1436   Wound Edges Open 08/17/23 1436   Wound Length (cm) 1.2 cm 08/17/23 1436   Wound Width (cm) 0.8 cm 08/17/23 1436   Wound Depth (cm) 0.2 cm 08/17/23 1436   Wound Volume (cm^3) 0.192 cm^3 08/17/23 1436   Wound Surface Area (cm^2) 0.96 cm^2 08/17/23 1436   Care Cleansed with:;Wound cleanser 08/17/23 1436   Dressing Applied;Other (comment) 08/17/23 1436   Dressing Change Due 08/18/23 08/17/23 1436       Left lower leg  STACIE, calcium alginate, bandage  CT      Right lower leg   STACIE, calcium alginate, bandage  CT       Assessment:         ICD-10-CM ICD-9-CM   1. Non-pressure chronic ulcer of left lower leg, limited to breakdown of skin  L97.921 707.10   2. Non-pressure chronic ulcer right lower leg, limited to breakdown skin  L97.911 707.10   3. Peripheral vascular disease  I73.9 443.9   4. Arterial insufficiency of lower extremity  I73.9 443.9   5. Venous reflux  I87.2 459.81   6. Diabetic autonomic neuropathy associated with type 2 diabetes mellitus  E11.43 250.60     337.1   7. Diabetic polyneuropathy associated with type 2 diabetes mellitus  E11.42 250.60     357.2             Procedures:     Mechanical debridement only     [] Yes [] No   I & D performed  [] Yes [] No   Excisional debridement performed  [] Yes [] No   Selective debridement performed           [x] Yes [] No   Mechanical debridement performed         [] Yes [] No  Silver nitrate applied                                     [] Yes [] No  Labs ordered this visit                                  [] Yes [] No   Imaging ordered this visit                           [] Yes [] No   Tissue pathology and/or culture taken         MEDICATIONS    Current Outpatient Medications:     amoxicillin-clavulanate 500-125mg (AUGMENTIN) 500-125 mg Tab, Take 1 tablet by mouth 2 (two) times daily., Disp: , Rfl:     ARIPiprazole (ABILIFY) 15 MG Tab, TAKE 1 TABLET BY MOUTH EVERY DAY AT BEDTIME FOR 90 DAYS, Disp: , Rfl:     atorvastatin (LIPITOR) 40 MG tablet, Take 40 mg by mouth., Disp: , Rfl:     benztropine (COGENTIN) 0.5 MG tablet, Take 0.5 mg by mouth every evening., Disp: , Rfl:     cefixime (SUPRAX) 400 mg Cap, SPRINKLE CONTENTS OF TWO CAPSULES DIRECTLY ONTO INFECTION SITE ONCE DAILY, Disp: , Rfl:     clopidogreL (PLAVIX) 75 mg tablet, , Disp: , Rfl:     diltiaZEM (CARDIZEM) 120 MG tablet, Take 120 mg by mouth., Disp: , Rfl:     furosemide (LASIX) 20 MG tablet, Take 20 mg by mouth., Disp: , Rfl:     glimepiride (AMARYL) 4 MG tablet, Take 4 mg by mouth 2 (two) times daily., Disp: ,  "Rfl:     HUMALOG KWIKPEN INSULIN 100 unit/mL pen, INJECT 15 UNITS SUBCUTANEOUSLY THREE TIMES DAILY, Disp: , Rfl:     hydrALAZINE (APRESOLINE) 50 MG tablet, Take 50 mg by mouth 3 (three) times daily., Disp: , Rfl:     LANTUS SOLOSTAR U-100 INSULIN glargine 100 units/mL SubQ pen, SMARTSIG:15 Unit(s) SUB-Q Daily, Disp: , Rfl:     levothyroxine (SYNTHROID) 25 MCG tablet, Take 25 mcg by mouth., Disp: , Rfl:     metFORMIN (GLUCOPHAGE) 1000 MG tablet, Take 1,000 mg by mouth 2 (two) times daily., Disp: , Rfl:     mupirocin (BACTROBAN) 2 % ointment, Apply topically 3 (three) times daily., Disp: , Rfl:     olmesartan (BENICAR) 40 MG tablet, Take 40 mg by mouth., Disp: , Rfl:     tamsulosin (FLOMAX) 0.4 mg Cap, Take 2 capsules by mouth., Disp: , Rfl:    Review of patient's allergies indicates:   Allergen Reactions    Sulfa (sulfonamide antibiotics)      Other reaction(s): itching       DIAGNOSTICS      Labs/Scans/Micro:    CBC:   Lab Results   Component Value Date    WBC 8.6 05/11/2018    HGB 13.8 (L) 05/11/2018    HCT 40.0 (L) 05/11/2018    MCV 86 05/11/2018     (H) 05/11/2018       Sedimentation rate:   Lab Results   Component Value Date    SEDRATE 45 (H) 11/27/2017      C-reactive protein: No results found for: "CRP" Chemistry: [unfilled]  HBA1C: No components found for: "HBA1C"    Ankle Brachial Index: No results found for this or any previous visit.      Imaging:    Plain film: No results found for this or any previous visit.    MRI: No results found for this or any previous visit.   No results found for this or any previous visit.    Bone Scan: No results found for this or any previous visit.   No results found for this or any previous visit.      Vascular studies: 05/15:    FINDINGS - arterial   Ultrasound Doppler and color flow imaging were performed on the arteries of the lower extremities bilaterally. There is extensive scattered plaque formation throughout the superficial and popliteal arteries " bilaterally resulting in stenosis estimated at 50-70%.  A biphasic flow wave pattern is noted to the common femoral, superficial femoral, popliteal, anterior tibial, posterior tibial, dorsalis pedis arteries bilaterally.   Impression:   1. Extensive plaque formation at the superficial at the popliteal arteries bilaterally resulting in stenosis estimated at 50-75%.     FINDINGS - venous  There is normal compression and flow noted in the  common femoral vein, superficial femoral vein, popliteal vein, and  posterior tibial veinbilaterally.  No evidence of deep venous thrombosis is identified.  There is bilateral reflux at the greater saphenous veins.  Reflux time on the right is 1131 milliseconds and reflux time on the left is 987 milliseconds   Impression:   1.  No deep venous thrombosis identified to the lower extremities bilaterally   2.  Venous reflux at the greater saphenous veins bilaterally.  Microbiology: Cx obtained 5/10/23    HOME HEALTH AGENCY:  [facility]  TIMES PER WEEK/DAYS:  1 x weekly  WOUND CARE ORDERS:    Right lower leg wound: Cleanse with wound cleanser, apply mupirocin ointment, silver alginate, and bandage to be changed daily   Left lower leg wound: Cleanse with wound cleanser, apply mupirocin ointment, silver alginate, and bandage to be changed daily      Follow up in 1 week (on 8/24/2023).        Electronically signed:  Sarah Rodriguez NP

## 2023-08-24 ENCOUNTER — HOSPITAL ENCOUNTER (OUTPATIENT)
Dept: WOUND CARE | Facility: HOSPITAL | Age: 78
Discharge: HOME | End: 2023-08-24
Attending: NURSE PRACTITIONER
Payer: MEDICARE

## 2023-08-24 VITALS
HEART RATE: 82 BPM | HEIGHT: 75 IN | RESPIRATION RATE: 18 BRPM | SYSTOLIC BLOOD PRESSURE: 191 MMHG | WEIGHT: 250 LBS | BODY MASS INDEX: 31.08 KG/M2 | DIASTOLIC BLOOD PRESSURE: 81 MMHG

## 2023-08-24 DIAGNOSIS — I73.9 ARTERIAL INSUFFICIENCY OF LOWER EXTREMITY: ICD-10-CM

## 2023-08-24 DIAGNOSIS — L97.921 NON-PRESSURE CHRONIC ULCER OF LEFT LOWER LEG, LIMITED TO BREAKDOWN OF SKIN: Primary | ICD-10-CM

## 2023-08-24 DIAGNOSIS — L97.911 NON-PRESSURE CHRONIC ULCER RIGHT LOWER LEG, LIMITED TO BREAKDOWN SKIN: ICD-10-CM

## 2023-08-24 DIAGNOSIS — I87.2 VENOUS REFLUX: ICD-10-CM

## 2023-08-24 DIAGNOSIS — I73.9 PERIPHERAL VASCULAR DISEASE: ICD-10-CM

## 2023-08-24 PROCEDURE — 27000999 HC MEDICAL RECORD PHOTO DOCUMENTATION

## 2023-08-24 PROCEDURE — 99213 OFFICE O/P EST LOW 20 MIN: CPT

## 2023-08-24 NOTE — PROGRESS NOTES
Subjective:       Patient ID: Drew Oliva is a 78 y.o. male.    Chief Complaint: Arterial Wound Follow Up (Right lower leg /Left lower leg)    HPI    5/10/23 - Mr. Oliva was referred to clinic by his PCP, Dr. Kaur.  The patient reports that in March, 2023, he tripped and fell at home and broke to ribs.  He healed from that situation, however, shortly afterwards he noticed that his lower legs began to swell and blisters began developing bilaterally.  He went to Dr. Kaur who felt that perhaps it was impact ago.  He was given Keflex in early April.  The wounds did not heal so the patient returned to Dr. Spears on 05/04/2023 and at that time he was referred to wound clinic.    He is a diabetic.  His last A1c was 6.6 in January, 2023.  His blood sugar today was 221.  A [place] was performed today with 0/10 sensation.  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  Doppler U/S were done with weak petal pulses, and unable to obtain posterior tibial pulses.  The patient has been referred for Cardiovascular U/S which will be done on 5/15/23 and 11:30 am.  He has a history of renal U/S, however, he has no cardiovascular history noted.    His legs were assessed today.  There are scattered open wounds at the lower bilateral legs.  A culture was obtained from the wounds on the right lower extremity.  He was given Hibiclens to wash the legs x 3 days and we will use silver alginate until the culture and U/S are reviewed.  He does see podiatrist, Dr. Mehta, in [place] for a large callus at the right great toe metatarsal head and for toe nail clipping.     5/17/23 - Mr. Oliva returns today for follow up on the bilateral lower extremity wounds.  He did have U/S done on 5/10/23 (results below) which show mixed peripheral vascular disease with bilateral stenosis of 50-75% throughout the superficial and popliteal arteries as well as venous reflux as the greater saphenous veins  bilaterally.  We did discuss his vascular disease and he has requested a referral to a vascular surgeon, Dr. Felix Drake (05/23 @ 12:30).  He also does not have any wound for cardiovascular, so we did discuss a referral to Dr. Reyes while he is at Dr. Drake's office.     Additionally, we reviewed his DNA culture which was obtained on 05/10/2023.  It is positive for multiple pathogens the most prevalent of which is enterococcus at 90%.  We will be requesting a recommendation for topical antibiotics through [facility] and he has been prescribed doxycycline 100 mg b.i.d. times 10 days.  He was educated on the vascular system and encouraged to, for the time being, avoid any constriction on the lower extremities cleared by Dr. Drake.  For the time being we will use silver alginate in a Kerlix wrap.  He has used Hibiclens x 3 days as requested and his legs do appear somewhat improved.  He will return to clinic in 1 week.    5/24/23 - The patient returns today for f/up on his bilateral lower extremity wounds.  He was seen by Dr. Drake 5/24/23, started on Plavix.  He will have his first procedure done on the right leg on Wednesday, 5/31/23.  We are unable to compress at this time until he is cleared.  Today the wounds were selectively debrided.  There is an increased number of blisters that have developed since his last visit.  The legs will be wrapped lightly with kerlix.  Additionally his topical abx were ordered last week.  They were not at clinic at the time of his appointment, however, he did pick those up by early afternoon.  He was educated on their use at the time of his visit.  He also continues to take his Doxy as ordered.     6/7/23 - Mr. Oliva returns today for followup on the bilateral lower extremity wounds.  He did have a procedure done by Dr. Felix Drake on 5/31/23.  Dr. Drake's note dated 05/23/2023 states that the patient has bilateral mixed wounds and will need bilateral staged arterial PTA  days.  The plan was to do the right in several days.  This some option is that the procedure that was performed on 05/31/2023 was that procedure, however, the patient states that he was told that there was no blockage in the right leg.  He also has a procedure scheduled on the left leg tomorrow, 06/08/2023.  He does have a new area of concern to the right leg appears to be a tape blister.  He also has a small area of concern at the right great toe metatarsal head.  He does have diabetes, however, he is followed by a podiatrist in [place].  A callus paring was done on this area today because his shoes were offer assessment.  There is an open wound that measures 0.4 x 0.4 x 0.2 cm.  We will continue to monitor, however, he prefers to have his podiatrist continue to follow that wound since he has been followed by him for several years.  He does have evidence of hammertoe and has had previous surgical intervention.  He continues to use topical abx (Cefepime) which was stared on 5/24/23.  We will d/c the topicals in one month, on or about 6/24/23 or his nearest appt.     6/13/23 - the patient returns today for followup on his bilateral lower extremities.  He did have a right leg arterial procedure performed a proximally 2 weeks ago, right leg venous procedure last week was completed, and tomorrow he is scheduled to have a left leg venous procedure performed.  Dr. Felix Drake is his vascular surgeon.  He was told that the arteries in the left leg all looks good.  He continues to use the topical antibiotics (Cefepime) with bassa gel, and has used them since 5/24/23.  The topicals should be discontinued at his next visit if the wounds appear stable.  Today there was no increase in drainage and all wounds do appear to be drying up nicely with the use of the topicals.    8/17/23 - Mr. Oliva returns today for wounds on his bilateral lower extremity.  He was last seen at clinic on 07/03/2023 at that time he was discharged.   His called today after noticing areas that appeared to be blisters on his bilateral lower legs.  He has followed up with his vascular surgeon, Dr. Felix Drake in the last couple of weeks and is scheduled for followup with him in 6 months.    Additionally, he is currently taking p.o. Augmentin which was prescribed by his podiatrist, Dr. Mehta for a foot wound.  He sees Dr. Mehta on Thursdays.  Dr. Mehta will continue monitoring and managing that wound.  Today he has 2 very small open areas on the left lower extremity and 1 on the right lower extremity.  Go incidentally, the on the right and left are almost the same identifiable location at the front of the shin.  We discussed the possibility that he bumped into an object.  He is not sure if that occurred or not, however, the wife did see a blister on the very small 2nd wound on the left leg.  Today, we applied alginate and they will continue to use silver alginate daily and we will reassess in 1 week.  She was instructed to take photos if any blister should develop over the next week.    8/24/23 - The patient returns today for several small scattered wounds on his bilateral lower extremities.  His wife reports that she noticed some new blisters on the left leg on 08/23/2023.  He has completed his Augmentin.  Today, those wounds were assessed and mechanically debrided.   She reports that they begin as small blisters, today, however, they are very small open wounds.  There are scattered on both lower extremities and he denies any itching, scratching, etc.  He has no edema and in fact the legs are quite thin so it is unlikely that these are venous in nature.  She does continue to apply mupirocin and she was instructed to continue to do so.  They were given Hibiclens to wash the legs for 3 days and then instructed to begin washing daily with Diovan soap.  For now, we will continue to monitor.  They will return in 2 weeks or sooner if the wound deteriorates.  "  Of note, he had an appt with Dr. Mheta (podiatrist) this morning and were told that the foot is "100% better".     Review of Systems      Objective:      Vitals:    08/24/23 1402   BP: (!) 191/81   Pulse: 82   Resp: 18       Physical Exam       Altered Skin Integrity 08/17/23 1437 Right lower Leg #1 (Active)   08/17/23 1437   Altered Skin Integrity Present on Admission - Did Patient arrive to the hospital with altered skin?: yes   Side: Right   Orientation: lower   Location: Leg   Wound Number: #1   Is this injury device related?: No   Primary Wound Type:    Description of Altered Skin Integrity:    Ankle-Brachial Index:    Pulses:    Removal Indication and Assessment:    Wound Outcome:    (Retired) Wound Length (cm):    (Retired) Wound Width (cm):    (Retired) Depth (cm):    Wound Description (Comments):    Removal Indications:    Dressing Appearance Moist drainage 08/24/23 1406   Drainage Amount Moderate 08/24/23 1406   Drainage Characteristics/Odor Serosanguineous 08/24/23 1406   Appearance Pink;Red;Moist 08/24/23 1406   Tissue loss description Full thickness 08/24/23 1406   Periwound Area [place];Dry 08/24/23 1406   Wound Edges Defined 08/24/23 1406   Wound Length (cm) 3.4 cm 08/24/23 1406   Wound Width (cm) 3 cm 08/24/23 1406   Wound Depth (cm) 0.2 cm 08/24/23 1406   Wound Volume (cm^3) 2.04 cm^3 08/24/23 1406   Wound Surface Area (cm^2) 10.2 cm^2 08/24/23 1406   Care Cleansed with:;Wound cleanser 08/24/23 1406   Dressing Applied 08/24/23 1406   Dressing Change Due 08/25/23 08/24/23 1406            Altered Skin Integrity 08/17/23 1437 Left lower Leg #2 (Active)   08/17/23 1437   Altered Skin Integrity Present on Admission - Did Patient arrive to the hospital with altered skin?: yes   Side: Left   Orientation: lower   Location: Leg   Wound Number: #2   Is this injury device related?: No   Primary Wound Type:    Description of Altered Skin Integrity:    Ankle-Brachial Index:    Pulses:    Removal Indication " and Assessment:    Wound Outcome:    (Retired) Wound Length (cm):    (Retired) Wound Width (cm):    (Retired) Depth (cm):    Wound Description (Comments):    Removal Indications:    Dressing Appearance Moist drainage 08/24/23 1406   Drainage Amount Moderate 08/24/23 1406   Drainage Characteristics/Odor Serosanguineous 08/24/23 1406   Appearance Pink;Red;Moist 08/24/23 1406   Tissue loss description Full thickness 08/24/23 1406   Periwound Area Dry;[place] 08/24/23 1406   Wound Edges Defined 08/24/23 1406   Wound Length (cm) 6.3 cm 08/24/23 1406   Wound Width (cm) 7 cm 08/24/23 1406   Wound Depth (cm) 0.2 cm 08/24/23 1406   Wound Volume (cm^3) 8.82 cm^3 08/24/23 1406   Wound Surface Area (cm^2) 44.1 cm^2 08/24/23 1406   Care Cleansed with:;Wound cleanser 08/24/23 1406   Dressing Applied 08/24/23 1406   Dressing Change Due 08/25/23 08/24/23 1406 8/24/23        Left lower leg (pre)                                           Right lower leg (pre)    Assessment:         ICD-10-CM ICD-9-CM   1. Non-pressure chronic ulcer of left lower leg, limited to breakdown of skin  L97.921 707.10   2. Non-pressure chronic ulcer right lower leg, limited to breakdown skin  L97.911 707.10   3. Peripheral vascular disease  I73.9 443.9   4. Arterial insufficiency of lower extremity  I73.9 443.9   5. Venous reflux  I87.2 459.81          Procedures:     Mechanical debridement only    [] Yes [] No   I & D performed  [] Yes [] No   Excisional debridement performed  [] Yes [] No   Selective debridement performed           [x] Yes [] No   Mechanical debridement performed         [] Yes [] No  Silver nitrate applied                                     [] Yes [] No  Labs ordered this visit                                  [] Yes [] No   Imaging ordered this visit                           [] Yes [] No   Tissue pathology and/or culture taken         MEDICATIONS    Current Outpatient Medications:     ARIPiprazole (ABILIFY) 15 MG Tab, TAKE 1  "TABLET BY MOUTH EVERY DAY AT BEDTIME FOR 90 DAYS, Disp: , Rfl:     atorvastatin (LIPITOR) 40 MG tablet, Take 40 mg by mouth., Disp: , Rfl:     benztropine (COGENTIN) 0.5 MG tablet, Take 0.5 mg by mouth every evening., Disp: , Rfl:     cefixime (SUPRAX) 400 mg Cap, SPRINKLE CONTENTS OF TWO CAPSULES DIRECTLY ONTO INFECTION SITE ONCE DAILY, Disp: , Rfl:     clopidogreL (PLAVIX) 75 mg tablet, , Disp: , Rfl:     diltiaZEM (CARDIZEM) 120 MG tablet, Take 120 mg by mouth., Disp: , Rfl:     furosemide (LASIX) 20 MG tablet, Take 20 mg by mouth., Disp: , Rfl:     glimepiride (AMARYL) 4 MG tablet, Take 4 mg by mouth 2 (two) times daily., Disp: , Rfl:     HUMALOG KWIKPEN INSULIN 100 unit/mL pen, INJECT 15 UNITS SUBCUTANEOUSLY THREE TIMES DAILY, Disp: , Rfl:     hydrALAZINE (APRESOLINE) 50 MG tablet, Take 50 mg by mouth 3 (three) times daily., Disp: , Rfl:     LANTUS SOLOSTAR U-100 INSULIN glargine 100 units/mL SubQ pen, SMARTSIG:15 Unit(s) SUB-Q Daily, Disp: , Rfl:     levothyroxine (SYNTHROID) 25 MCG tablet, Take 25 mcg by mouth., Disp: , Rfl:     metFORMIN (GLUCOPHAGE) 1000 MG tablet, Take 1,000 mg by mouth 2 (two) times daily., Disp: , Rfl:     mupirocin (BACTROBAN) 2 % ointment, Apply topically 3 (three) times daily., Disp: , Rfl:     olmesartan (BENICAR) 40 MG tablet, Take 40 mg by mouth., Disp: , Rfl:     tamsulosin (FLOMAX) 0.4 mg Cap, Take 2 capsules by mouth., Disp: , Rfl:    Review of patient's allergies indicates:   Allergen Reactions    Sulfa (sulfonamide antibiotics)      Other reaction(s): itching       DIAGNOSTICS      Labs/Scans/Micro:    CBC:   Lab Results   Component Value Date    WBC 8.6 05/11/2018    HGB 13.8 (L) 05/11/2018    HCT 40.0 (L) 05/11/2018    MCV 86 05/11/2018     (H) 05/11/2018       Sedimentation rate:   Lab Results   Component Value Date    SEDRATE 45 (H) 11/27/2017      C-reactive protein: No results found for: "CRP" Chemistry: [unfilled]  HBA1C: No components found for: " ""HBA1C"    Ankle Brachial Index: No results found for this or any previous visit.      Imaging:    Plain film: No results found for this or any previous visit.    MRI: No results found for this or any previous visit.   No results found for this or any previous visit.    Bone Scan: No results found for this or any previous visit.   No results found for this or any previous visit.      Vascular studies: 05/15:    FINDINGS - arterial   Ultrasound Doppler and color flow imaging were performed on the arteries of the lower extremities bilaterally. There is extensive scattered plaque formation throughout the superficial and popliteal arteries bilaterally resulting in stenosis estimated at 50-70%.  A biphasic flow wave pattern is noted to the common femoral, superficial femoral, popliteal, anterior tibial, posterior tibial, dorsalis pedis arteries bilaterally.   Impression:   1. Extensive plaque formation at the superficial at the popliteal arteries bilaterally resulting in stenosis estimated at 50-75%.     FINDINGS - venous  There is normal compression and flow noted in the  common femoral vein, superficial femoral vein, popliteal vein, and  posterior tibial veinbilaterally.  No evidence of deep venous thrombosis is identified.  There is bilateral reflux at the greater saphenous veins.  Reflux time on the right is 1131 milliseconds and reflux time on the left is 987 milliseconds   Impression:   1.  No deep venous thrombosis identified to the lower extremities bilaterally   2.  Venous reflux at the greater saphenous veins bilaterally.  Microbiology: Cx obtained 5/10/23    HOME HEALTH AGENCY:  [facility]  TIMES PER WEEK/DAYS:  1 x weekly  WOUND CARE ORDERS:    Right lower leg wound: Cleanse with hibicleans for 3 days in a row, after 3 days, cleanse with dial soap, then apply mupirocin ointment to wounds, leave open to air, do this daily  Left lower leg wound: Cleanse with hibicleans for 3 days in a row, after 3 days, " cleanse with dial soap, then apply mupirocin ointment to wounds, leave open to air, do this daily    Follow up in 2 weeks (on 9/7/2023) for BLE.        Electronically signed:  Sarah Rodriguez NP

## 2023-09-06 ENCOUNTER — HOSPITAL ENCOUNTER (OUTPATIENT)
Dept: WOUND CARE | Facility: HOSPITAL | Age: 78
Discharge: HOME | End: 2023-09-06
Attending: NURSE PRACTITIONER
Payer: MEDICARE

## 2023-09-06 VITALS
DIASTOLIC BLOOD PRESSURE: 87 MMHG | RESPIRATION RATE: 16 BRPM | BODY MASS INDEX: 31.08 KG/M2 | HEIGHT: 75 IN | SYSTOLIC BLOOD PRESSURE: 193 MMHG | WEIGHT: 250 LBS | HEART RATE: 88 BPM

## 2023-09-06 DIAGNOSIS — L97.911 NON-PRESSURE CHRONIC ULCER RIGHT LOWER LEG, LIMITED TO BREAKDOWN SKIN: ICD-10-CM

## 2023-09-06 DIAGNOSIS — I73.9 ARTERIAL INSUFFICIENCY OF LOWER EXTREMITY: ICD-10-CM

## 2023-09-06 DIAGNOSIS — L97.921 NON-PRESSURE CHRONIC ULCER OF LEFT LOWER LEG, LIMITED TO BREAKDOWN OF SKIN: Primary | ICD-10-CM

## 2023-09-06 DIAGNOSIS — I73.9 PERIPHERAL VASCULAR DISEASE: ICD-10-CM

## 2023-09-06 PROCEDURE — 27000999 HC MEDICAL RECORD PHOTO DOCUMENTATION

## 2023-09-06 PROCEDURE — 99212 OFFICE O/P EST SF 10 MIN: CPT

## 2023-09-06 NOTE — PROGRESS NOTES
Subjective:       Patient ID: Drew Oliva is a 78 y.o. male.    Chief Complaint: Arterial Wound Follow Up (Right lower leg /Left lower leg)    HPI    5/10/23 - Mr. Oliva was referred to clinic by his PCP, Dr. Kaur.  The patient reports that in March, 2023, he tripped and fell at home and broke to ribs.  He healed from that situation, however, shortly afterwards he noticed that his lower legs began to swell and blisters began developing bilaterally.  He went to Dr. Kaur who felt that perhaps it was impact ago.  He was given Keflex in early April.  The wounds did not heal so the patient returned to Dr. Spears on 05/04/2023 and at that time he was referred to wound clinic.    He is a diabetic.  His last A1c was 6.6 in January, 2023.  His blood sugar today was 221.  A [place] Dragan was performed today with 0/10 sensation.  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  Doppler U/S were done with weak petal pulses, and unable to obtain posterior tibial pulses.  The patient has been referred for Cardiovascular U/S which will be done on 5/15/23 and 11:30 am.  He has a history of renal U/S, however, he has no cardiovascular history noted.    His legs were assessed today.  There are scattered open wounds at the lower bilateral legs.  A culture was obtained from the wounds on the right lower extremity.  He was given Hibiclens to wash the legs x 3 days and we will use silver alginate until the culture and U/S are reviewed.  He does see podiatrist, Dr. Mehta, in [place] for a large callus at the right great toe metatarsal head and for toe nail clipping.     5/17/23 - Mr. Oliva returns today for follow up on the bilateral lower extremity wounds.  He did have U/S done on 5/10/23 (results below) which show mixed peripheral vascular disease with bilateral stenosis of 50-75% throughout the superficial and popliteal arteries as well as venous reflux as the greater saphenous veins  bilaterally.  We did discuss his vascular disease and he has requested a referral to a vascular surgeon, Dr. Felix Drake (05/23 @ 12:30).  He also does not have any wound for cardiovascular, so we did discuss a referral to Dr. Reyes while he is at Dr. Drake's office.     Additionally, we reviewed his DNA culture which was obtained on 05/10/2023.  It is positive for multiple pathogens the most prevalent of which is enterococcus at 90%.  We will be requesting a recommendation for topical antibiotics through [facility] and he has been prescribed doxycycline 100 mg b.i.d. times 10 days.  He was educated on the vascular system and encouraged to, for the time being, avoid any constriction on the lower extremities cleared by Dr. Drake.  For the time being we will use silver alginate in a Kerlix wrap.  He has used Hibiclens x 3 days as requested and his legs do appear somewhat improved.  He will return to clinic in 1 week.    5/24/23 - The patient returns today for f/up on his bilateral lower extremity wounds.  He was seen by Dr. Drake 5/24/23, started on Plavix.  He will have his first procedure done on the right leg on Wednesday, 5/31/23.  We are unable to compress at this time until he is cleared.  Today the wounds were selectively debrided.  There is an increased number of blisters that have developed since his last visit.  The legs will be wrapped lightly with kerlix.  Additionally his topical abx were ordered last week.  They were not at clinic at the time of his appointment, however, he did pick those up by early afternoon.  He was educated on their use at the time of his visit.  He also continues to take his Doxy as ordered.     6/7/23 - Mr. Oliva returns today for followup on the bilateral lower extremity wounds.  He did have a procedure done by Dr. Felix Drake on 5/31/23.  Dr. Drake's note dated 05/23/2023 states that the patient has bilateral mixed wounds and will need bilateral staged arterial PTA  days.  The plan was to do the right in several days.  This some option is that the procedure that was performed on 05/31/2023 was that procedure, however, the patient states that he was told that there was no blockage in the right leg.  He also has a procedure scheduled on the left leg tomorrow, 06/08/2023.  He does have a new area of concern to the right leg appears to be a tape blister.  He also has a small area of concern at the right great toe metatarsal head.  He does have diabetes, however, he is followed by a podiatrist in [place].  A callus paring was done on this area today because his shoes were offer assessment.  There is an open wound that measures 0.4 x 0.4 x 0.2 cm.  We will continue to monitor, however, he prefers to have his podiatrist continue to follow that wound since he has been followed by him for several years.  He does have evidence of hammertoe and has had previous surgical intervention.  He continues to use topical abx (Cefepime) which was stared on 5/24/23.  We will d/c the topicals in one month, on or about 6/24/23 or his nearest appt.     6/13/23 - the patient returns today for followup on his bilateral lower extremities.  He did have a right leg arterial procedure performed a proximally 2 weeks ago, right leg venous procedure last week was completed, and tomorrow he is scheduled to have a left leg venous procedure performed.  Dr. Felix Drake is his vascular surgeon.  He was told that the arteries in the left leg all looks good.  He continues to use the topical antibiotics (Cefepime) with bassa gel, and has used them since 5/24/23.  The topicals should be discontinued at his next visit if the wounds appear stable.  Today there was no increase in drainage and all wounds do appear to be drying up nicely with the use of the topicals.    8/17/23 - Mr. Oliva returns today for wounds on his bilateral lower extremity.  He was last seen at clinic on 07/03/2023 at that time he was discharged.   His called today after noticing areas that appeared to be blisters on his bilateral lower legs.  He has followed up with his vascular surgeon, Dr. Felix Drake in the last couple of weeks and is scheduled for followup with him in 6 months.    Additionally, he is currently taking p.o. Augmentin which was prescribed by his podiatrist, Dr. Mehta for a foot wound.  He sees Dr. Mehta on Thursdays.  Dr. Mehta will continue monitoring and managing that wound.  Today he has 2 very small open areas on the left lower extremity and 1 on the right lower extremity.  Go incidentally, the on the right and left are almost the same identifiable location at the front of the shin.  We discussed the possibility that he bumped into an object.  He is not sure if that occurred or not, however, the wife did see a blister on the very small 2nd wound on the left leg.  Today, we applied alginate and they will continue to use silver alginate daily and we will reassess in 1 week.  She was instructed to take photos if any blister should develop over the next week.    8/24/23 - The patient returns today for several small scattered wounds on his bilateral lower extremities.  His wife reports that she noticed some new blisters on the left leg on 08/23/2023.  He has completed his Augmentin.  Today, those wounds were assessed and mechanically debrided.   She reports that they begin as small blisters, today, however, they are very small open wounds.  There are scattered on both lower extremities and he denies any itching, scratching, etc.  He has no edema and in fact the legs are quite thin so it is unlikely that these are venous in nature.  She does continue to apply mupirocin and she was instructed to continue to do so.  They were given Hibiclens to wash the legs for 3 days and then instructed to begin washing daily with Dial soap.  For now, we will continue to monitor.  They will return in 2 weeks or sooner if the wound deteriorates.  "  Of note, he had an appt with Dr. Mehta (podiatrist) this morning and were told that the foot is "100% better".     9/6/23 - Mr. Oliva is seen today for followup on the wounds to his bilateral lower extremities.  He has been using mupirocin on the very small areas of irritation.  Today, the majority of those wounds are now closed.  He will follow up in 3 months and return sooner if he has any further issues.    Review of Systems      Objective:      Vitals:    09/06/23 0808   BP: (!) 193/87   Pulse: 88   Resp: 16       Physical Exam  [REMOVED]      Altered Skin Integrity 08/17/23 1437 Right lower Leg #1 (Removed)   08/17/23 1437   Altered Skin Integrity Present on Admission - Did Patient arrive to the hospital with altered skin?: yes   Side: Right   Orientation: lower   Location: Leg   Wound Number: #1   Is this injury device related?: No   Primary Wound Type:    Description of Altered Skin Integrity:    Ankle-Brachial Index:    Pulses:    Removal Indication and Assessment:    Wound Outcome: Healed   (Retired) Wound Length (cm):    (Retired) Wound Width (cm):    (Retired) Depth (cm):    Wound Description (Comments):    Removal Indications:    Removed 09/06/23 0832   Dressing Appearance Open to air 09/06/23 0809   Drainage Amount None 09/06/23 0809   Wound Edges Rolled/closed 09/06/23 0809   Wound Length (cm) 0 cm 09/06/23 0809   Wound Width (cm) 0 cm 09/06/23 0809   Wound Depth (cm) 0 cm 09/06/23 0809   Wound Volume (cm^3) 0 cm^3 09/06/23 0809   Wound Surface Area (cm^2) 0 cm^2 09/06/23 0809       [REMOVED]      Altered Skin Integrity 08/17/23 1437 Left lower Leg #2 (Removed)   08/17/23 1437   Altered Skin Integrity Present on Admission - Did Patient arrive to the hospital with altered skin?: yes   Side: Left   Orientation: lower   Location: Leg   Wound Number: #2   Is this injury device related?: No   Primary Wound Type:    Description of Altered Skin Integrity:    Ankle-Brachial Index:    Pulses:  "   Removal Indication and Assessment:    Wound Outcome: Healed   (Retired) Wound Length (cm):    (Retired) Wound Width (cm):    (Retired) Depth (cm):    Wound Description (Comments):    Removal Indications:    Removed 09/06/23 0832   Dressing Appearance Open to air 09/06/23 0809   Drainage Amount None 09/06/23 0809   Wound Edges Rolled/closed 09/06/23 0809   Wound Length (cm) 0 cm 09/06/23 0809   Wound Width (cm) 0 cm 09/06/23 0809   Wound Depth (cm) 0 cm 09/06/23 0809   Wound Volume (cm^3) 0 cm^3 09/06/23 0809   Wound Surface Area (cm^2) 0 cm^2 09/06/23 0809         9/6/23  Left leg                                                        Right leg  FLAVIA    Assessment:         ICD-10-CM ICD-9-CM   1. Non-pressure chronic ulcer of left lower leg, limited to breakdown of skin  L97.921 707.10   2. Non-pressure chronic ulcer right lower leg, limited to breakdown skin  L97.911 707.10   3. Peripheral vascular disease  I73.9 443.9   4. Arterial insufficiency of lower extremity  I73.9 443.9            Procedures:     Mechanical debridement only    [] Yes [] No   I & D performed  [] Yes [] No   Excisional debridement performed  [] Yes [] No   Selective debridement performed           [x] Yes [] No   Mechanical debridement performed         [] Yes [] No  Silver nitrate applied                                     [] Yes [] No  Labs ordered this visit                                  [] Yes [] No   Imaging ordered this visit                           [] Yes [] No   Tissue pathology and/or culture taken         MEDICATIONS    Current Outpatient Medications:     ARIPiprazole (ABILIFY) 15 MG Tab, TAKE 1 TABLET BY MOUTH EVERY DAY AT BEDTIME FOR 90 DAYS, Disp: , Rfl:     atorvastatin (LIPITOR) 40 MG tablet, Take 40 mg by mouth., Disp: , Rfl:     benztropine (COGENTIN) 0.5 MG tablet, Take 0.5 mg by mouth every evening., Disp: , Rfl:     cefixime (SUPRAX) 400 mg Cap, SPRINKLE CONTENTS OF TWO CAPSULES DIRECTLY ONTO INFECTION SITE ONCE  "DAILY, Disp: , Rfl:     clopidogreL (PLAVIX) 75 mg tablet, , Disp: , Rfl:     diltiaZEM (CARDIZEM) 120 MG tablet, Take 120 mg by mouth., Disp: , Rfl:     furosemide (LASIX) 20 MG tablet, Take 20 mg by mouth., Disp: , Rfl:     glimepiride (AMARYL) 4 MG tablet, Take 4 mg by mouth 2 (two) times daily., Disp: , Rfl:     HUMALOG KWIKPEN INSULIN 100 unit/mL pen, INJECT 15 UNITS SUBCUTANEOUSLY THREE TIMES DAILY, Disp: , Rfl:     hydrALAZINE (APRESOLINE) 50 MG tablet, Take 50 mg by mouth 3 (three) times daily., Disp: , Rfl:     LANTUS SOLOSTAR U-100 INSULIN glargine 100 units/mL SubQ pen, SMARTSIG:15 Unit(s) SUB-Q Daily, Disp: , Rfl:     levothyroxine (SYNTHROID) 25 MCG tablet, Take 25 mcg by mouth., Disp: , Rfl:     metFORMIN (GLUCOPHAGE) 1000 MG tablet, Take 1,000 mg by mouth 2 (two) times daily., Disp: , Rfl:     mupirocin (BACTROBAN) 2 % ointment, Apply topically 3 (three) times daily., Disp: , Rfl:     olmesartan (BENICAR) 40 MG tablet, Take 40 mg by mouth., Disp: , Rfl:     tamsulosin (FLOMAX) 0.4 mg Cap, Take 2 capsules by mouth., Disp: , Rfl:    Review of patient's allergies indicates:   Allergen Reactions    Sulfa (sulfonamide antibiotics)      Other reaction(s): itching       DIAGNOSTICS      Labs/Scans/Micro:    CBC:   Lab Results   Component Value Date    WBC 8.6 05/11/2018    HGB 13.8 (L) 05/11/2018    HCT 40.0 (L) 05/11/2018    MCV 86 05/11/2018     (H) 05/11/2018       Sedimentation rate:   Lab Results   Component Value Date    SEDRATE 45 (H) 11/27/2017      C-reactive protein: No results found for: "CRP" Chemistry: [unfilled]  HBA1C: No components found for: "HBA1C"    Ankle Brachial Index: No results found for this or any previous visit.      Imaging:    Plain film: No results found for this or any previous visit.    MRI: No results found for this or any previous visit.   No results found for this or any previous visit.    Bone Scan: No results found for this or any previous visit.   No results " found for this or any previous visit.      Vascular studies: 05/15:    FINDINGS - arterial   Ultrasound Doppler and color flow imaging were performed on the arteries of the lower extremities bilaterally. There is extensive scattered plaque formation throughout the superficial and popliteal arteries bilaterally resulting in stenosis estimated at 50-70%.  A biphasic flow wave pattern is noted to the common femoral, superficial femoral, popliteal, anterior tibial, posterior tibial, dorsalis pedis arteries bilaterally.   Impression:   1. Extensive plaque formation at the superficial at the popliteal arteries bilaterally resulting in stenosis estimated at 50-75%.     FINDINGS - venous  There is normal compression and flow noted in the  common femoral vein, superficial femoral vein, popliteal vein, and  posterior tibial veinbilaterally.  No evidence of deep venous thrombosis is identified.  There is bilateral reflux at the greater saphenous veins.  Reflux time on the right is 1131 milliseconds and reflux time on the left is 987 milliseconds   Impression:   1.  No deep venous thrombosis identified to the lower extremities bilaterally   2.  Venous reflux at the greater saphenous veins bilaterally.  Microbiology: Cx obtained 5/10/23    HOME HEALTH AGENCY:  [facility]  TIMES PER WEEK/DAYS:  1 x weekly  WOUND CARE ORDERS:    Right lower leg wound: Healed  Left lower leg wound: Healed  Follow up in about 3 months (around 12/6/2023) for PRN or if symptoms worsen.        Electronically signed:  Sarah Rodriguez NP

## 2023-09-26 ENCOUNTER — HOSPITAL ENCOUNTER (OUTPATIENT)
Dept: WOUND CARE | Facility: HOSPITAL | Age: 78
Discharge: HOME | End: 2023-09-26
Attending: NURSE PRACTITIONER
Payer: MEDICARE

## 2023-09-26 VITALS
HEART RATE: 79 BPM | DIASTOLIC BLOOD PRESSURE: 72 MMHG | HEIGHT: 75 IN | WEIGHT: 250 LBS | BODY MASS INDEX: 31.08 KG/M2 | SYSTOLIC BLOOD PRESSURE: 178 MMHG | RESPIRATION RATE: 18 BRPM

## 2023-09-26 DIAGNOSIS — L97.921 NON-PRESSURE CHRONIC ULCER OF LEFT LOWER LEG, LIMITED TO BREAKDOWN OF SKIN: Primary | ICD-10-CM

## 2023-09-26 DIAGNOSIS — I73.9 PERIPHERAL VASCULAR DISEASE: ICD-10-CM

## 2023-09-26 DIAGNOSIS — I73.9 ARTERIAL INSUFFICIENCY OF LOWER EXTREMITY: ICD-10-CM

## 2023-09-26 PROCEDURE — 99212 OFFICE O/P EST SF 10 MIN: CPT

## 2023-09-26 PROCEDURE — 27000999 HC MEDICAL RECORD PHOTO DOCUMENTATION

## 2023-09-26 NOTE — PROGRESS NOTES
Subjective:       Patient ID: Drew Oliva is a 78 y.o. male.    Chief Complaint: Arterial Wound Follow Up (Left lower leg)    HPI    5/10/23 - Mr. Oliva was referred to clinic by his PCP, Dr. Kaur.  The patient reports that in March, 2023, he tripped and fell at home and broke to ribs.  He healed from that situation, however, shortly afterwards he noticed that his lower legs began to swell and blisters began developing bilaterally.  He went to Dr. Kaur who felt that perhaps it was impact ago.  He was given Keflex in early April.  The wounds did not heal so the patient returned to Dr. Spears on 05/04/2023 and at that time he was referred to wound clinic.    He is a diabetic.  His last A1c was 6.6 in January, 2023.  His blood sugar today was 221.  A Langston Dragan was performed today with 0/10 sensation.  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  Doppler U/S were done with weak petal pulses, and unable to obtain posterior tibial pulses.  The patient has been referred for Cardiovascular U/S which will be done on 5/15/23 and 11:30 am.  He has a history of renal U/S, however, he has no cardiovascular history noted.    His legs were assessed today.  There are scattered open wounds at the lower bilateral legs.  A culture was obtained from the wounds on the right lower extremity.  He was given Hibiclens to wash the legs x 3 days and we will use silver alginate until the culture and U/S are reviewed.  He does see podiatrist, Dr. Mehta, in [place] for a large callus at the right great toe metatarsal head and for toe nail clipping.     5/17/23 - Mr. Oliva returns today for follow up on the bilateral lower extremity wounds.  He did have U/S done on 5/10/23 (results below) which show mixed peripheral vascular disease with bilateral stenosis of 50-75% throughout the superficial and popliteal arteries as well as venous reflux as the greater saphenous veins bilaterally.  We  did discuss his vascular disease and he has requested a referral to a vascular surgeon, Dr. Felix Drake (05/23 @ 12:30).  He also does not have any wound for cardiovascular, so we did discuss a referral to Dr. Reyes while he is at Dr. Drake's office.     Additionally, we reviewed his DNA culture which was obtained on 05/10/2023.  It is positive for multiple pathogens the most prevalent of which is enterococcus at 90%.  We will be requesting a recommendation for topical antibiotics through [facility] and he has been prescribed doxycycline 100 mg b.i.d. times 10 days.  He was educated on the vascular system and encouraged to, for the time being, avoid any constriction on the lower extremities cleared by Dr. Drake.  For the time being we will use silver alginate in a Kerlix wrap.  He has used Hibiclens x 3 days as requested and his legs do appear somewhat improved.  He will return to clinic in 1 week.    5/24/23 - The patient returns today for f/up on his bilateral lower extremity wounds.  He was seen by Dr. Drake 5/24/23, started on Plavix.  He will have his first procedure done on the right leg on Wednesday, 5/31/23.  We are unable to compress at this time until he is cleared.  Today the wounds were selectively debrided.  There is an increased number of blisters that have developed since his last visit.  The legs will be wrapped lightly with kerlix.  Additionally his topical abx were ordered last week.  They were not at clinic at the time of his appointment, however, he did pick those up by early afternoon.  He was educated on their use at the time of his visit.  He also continues to take his Doxy as ordered.     6/7/23 - Mr. Oliva returns today for followup on the bilateral lower extremity wounds.  He did have a procedure done by Dr. Felix Drake on 5/31/23.  Dr. Drake's note dated 05/23/2023 states that the patient has bilateral mixed wounds and will need bilateral staged arterial PTA days.  The plan was  to do the right in several days.  This some option is that the procedure that was performed on 05/31/2023 was that procedure, however, the patient states that he was told that there was no blockage in the right leg.  He also has a procedure scheduled on the left leg tomorrow, 06/08/2023.  He does have a new area of concern to the right leg appears to be a tape blister.  He also has a small area of concern at the right great toe metatarsal head.  He does have diabetes, however, he is followed by a podiatrist in [place].  A callus paring was done on this area today because his shoes were offer assessment.  There is an open wound that measures 0.4 x 0.4 x 0.2 cm.  We will continue to monitor, however, he prefers to have his podiatrist continue to follow that wound since he has been followed by him for several years.  He does have evidence of hammertoe and has had previous surgical intervention.  He continues to use topical abx (Cefepime) which was stared on 5/24/23.  We will d/c the topicals in one month, on or about 6/24/23 or his nearest appt.     6/13/23 - the patient returns today for followup on his bilateral lower extremities.  He did have a right leg arterial procedure performed a proximally 2 weeks ago, right leg venous procedure last week was completed, and tomorrow he is scheduled to have a left leg venous procedure performed.  Dr. Felix Drake is his vascular surgeon.  He was told that the arteries in the left leg all looks good.  He continues to use the topical antibiotics (Cefepime) with bassa gel, and has used them since 5/24/23.  The topicals should be discontinued at his next visit if the wounds appear stable.  Today there was no increase in drainage and all wounds do appear to be drying up nicely with the use of the topicals.    8/17/23 - Mr. Oliva returns today for wounds on his bilateral lower extremity.  He was last seen at clinic on 07/03/2023 at that time he was discharged.  His called today  after noticing areas that appeared to be blisters on his bilateral lower legs.  He has followed up with his vascular surgeon, Dr. Felix Drake in the last couple of weeks and is scheduled for followup with him in 6 months.    Additionally, he is currently taking p.o. Augmentin which was prescribed by his podiatrist, Dr. Mehta for a foot wound.  He sees Dr. Mehta on Thursdays.  Dr. Mehta will continue monitoring and managing that wound.  Today he has 2 very small open areas on the left lower extremity and 1 on the right lower extremity.  Go incidentally, the on the right and left are almost the same identifiable location at the front of the shin.  We discussed the possibility that he bumped into an object.  He is not sure if that occurred or not, however, the wife did see a blister on the very small 2nd wound on the left leg.  Today, we applied alginate and they will continue to use silver alginate daily and we will reassess in 1 week.  She was instructed to take photos if any blister should develop over the next week.    8/24/23 - The patient returns today for several small scattered wounds on his bilateral lower extremities.  His wife reports that she noticed some new blisters on the left leg on 08/23/2023.  He has completed his Augmentin.  Today, those wounds were assessed and mechanically debrided.   She reports that they begin as small blisters, today, however, they are very small open wounds.  There are scattered on both lower extremities and he denies any itching, scratching, etc.  He has no edema and in fact the legs are quite thin so it is unlikely that these are venous in nature.  She does continue to apply mupirocin and she was instructed to continue to do so.  They were given Hibiclens to wash the legs for 3 days and then instructed to begin washing daily with Dial soap.  For now, we will continue to monitor.  They will return in 2 weeks or sooner if the wound deteriorates.   Of note, he had an  "appt with Dr. Mehta (podiatrist) this morning and were told that the foot is "100% better".     9/6/23 - Mr. Oliva is seen today for followup on the wounds to his bilateral lower extremities.  He has been using mupirocin on the very small areas of irritation.  Today, the majority of those wounds are now closed.  He will follow up in 3 months and return sooner if he has any further issues.    9/26/23 - The patient returns today for followup on his left lower extremity which today has several scattered small wounds.  At his last visit there were no wounds open and he was to return in a couple of months.  However, his wife reports that over the last week new ulcers started erupted and burst thing, particularly on the left lower extremity.  She has been applying Bactroban to these areas which seems to be effective.  Because she has been using Bactroban no culture was obtained.  The wounds were wrapped with Kerlix and he will return in 1 week.    Review of Systems      Objective:      Vitals:    09/26/23 1322   BP: (!) 178/72   Pulse: 79   Resp: 18       Physical Exam       Altered Skin Integrity 09/26/23 1324 Left lower Leg #2 (Active)   09/26/23 1324   Altered Skin Integrity Present on Admission - Did Patient arrive to the hospital with altered skin?: yes   Side: Left   Orientation: lower   Location: Leg   Wound Number: #2   Is this injury device related?: No   Primary Wound Type:    Description of Altered Skin Integrity:    Ankle-Brachial Index:    Pulses:    Removal Indication and Assessment:    Wound Outcome:    (Retired) Wound Length (cm):    (Retired) Wound Width (cm):    (Retired) Depth (cm):    Wound Description (Comments):    Removal Indications:    Description of Altered Skin Integrity Full thickness tissue loss. Subcutaneous fat may be visible but bone, tendon or muscle are not exposed 09/26/23 1323   Dressing Appearance Moist drainage 09/26/23 1323   Drainage Amount Small 09/26/23 1323   Drainage " Characteristics/Odor Clear 09/26/23 1323   Appearance Intact;Eschar;Other (see comments) 09/26/23 1323   Tissue loss description Full thickness 09/26/23 1323   Periwound Area Intact;Redness 09/26/23 1323   Wound Edges Undefined 09/26/23 1323   Wound Length (cm) 18 cm 09/26/23 1323   Wound Width (cm) 15.5 cm 09/26/23 1323   Wound Depth (cm) 0.2 cm 09/26/23 1323   Wound Volume (cm^3) 55.8 cm^3 09/26/23 1323   Wound Surface Area (cm^2) 279 cm^2 09/26/23 1323   Care Cleansed with:;Wound cleanser 09/26/23 1323   Dressing Applied 09/26/23 1323   Dressing Change Due 09/27/23 09/26/23 1323 9/26/23      Left lateral lower leg                                    Left anterior lower leg  STACIE, kerlix, tape, kerlix, tape  TR      Assessment:         ICD-10-CM ICD-9-CM   1. Non-pressure chronic ulcer of left lower leg, limited to breakdown of skin  L97.921 707.10   2. Peripheral vascular disease  I73.9 443.9   3. Arterial insufficiency of lower extremity  I73.9 443.9       Procedures:     Mechanical debridement only    [] Yes [] No   I & D performed  [] Yes [] No   Excisional debridement performed  [] Yes [] No   Selective debridement performed           [x] Yes [] No   Mechanical debridement performed         [] Yes [] No  Silver nitrate applied                                     [] Yes [] No  Labs ordered this visit                                  [] Yes [] No   Imaging ordered this visit                           [] Yes [] No   Tissue pathology and/or culture taken         MEDICATIONS    Current Outpatient Medications:     ARIPiprazole (ABILIFY) 15 MG Tab, TAKE 1 TABLET BY MOUTH EVERY DAY AT BEDTIME FOR 90 DAYS, Disp: , Rfl:     atorvastatin (LIPITOR) 40 MG tablet, Take 40 mg by mouth., Disp: , Rfl:     benztropine (COGENTIN) 0.5 MG tablet, Take 0.5 mg by mouth every evening., Disp: , Rfl:     cefixime (SUPRAX) 400 mg Cap, SPRINKLE CONTENTS OF TWO CAPSULES DIRECTLY ONTO INFECTION SITE ONCE DAILY, Disp: , Rfl:      "clopidogreL (PLAVIX) 75 mg tablet, , Disp: , Rfl:     diltiaZEM (CARDIZEM) 120 MG tablet, Take 120 mg by mouth., Disp: , Rfl:     furosemide (LASIX) 20 MG tablet, Take 20 mg by mouth., Disp: , Rfl:     glimepiride (AMARYL) 4 MG tablet, Take 4 mg by mouth 2 (two) times daily., Disp: , Rfl:     HUMALOG KWIKPEN INSULIN 100 unit/mL pen, INJECT 15 UNITS SUBCUTANEOUSLY THREE TIMES DAILY, Disp: , Rfl:     hydrALAZINE (APRESOLINE) 50 MG tablet, Take 50 mg by mouth 3 (three) times daily., Disp: , Rfl:     LANTUS SOLOSTAR U-100 INSULIN glargine 100 units/mL SubQ pen, SMARTSIG:15 Unit(s) SUB-Q Daily, Disp: , Rfl:     levothyroxine (SYNTHROID) 25 MCG tablet, Take 25 mcg by mouth., Disp: , Rfl:     metFORMIN (GLUCOPHAGE) 1000 MG tablet, Take 1,000 mg by mouth 2 (two) times daily., Disp: , Rfl:     mupirocin (BACTROBAN) 2 % ointment, Apply topically 3 (three) times daily., Disp: , Rfl:     olmesartan (BENICAR) 40 MG tablet, Take 40 mg by mouth., Disp: , Rfl:     tamsulosin (FLOMAX) 0.4 mg Cap, Take 2 capsules by mouth., Disp: , Rfl:    Review of patient's allergies indicates:   Allergen Reactions    Sulfa (sulfonamide antibiotics)      Other reaction(s): itching       DIAGNOSTICS      Labs/Scans/Micro:    CBC:   Lab Results   Component Value Date    WBC 8.6 05/11/2018    HGB 13.8 (L) 05/11/2018    HCT 40.0 (L) 05/11/2018    MCV 86 05/11/2018     (H) 05/11/2018       Sedimentation rate:   Lab Results   Component Value Date    SEDRATE 45 (H) 11/27/2017      C-reactive protein: No results found for: "CRP" Chemistry: [unfilled]  HBA1C: No components found for: "HBA1C"    Ankle Brachial Index: No results found for this or any previous visit.      Imaging:    Plain film: No results found for this or any previous visit.    MRI: No results found for this or any previous visit.   No results found for this or any previous visit.    Bone Scan: No results found for this or any previous visit.   No results found for this or any " previous visit.      Vascular studies: 05/15:    FINDINGS - arterial   Ultrasound Doppler and color flow imaging were performed on the arteries of the lower extremities bilaterally. There is extensive scattered plaque formation throughout the superficial and popliteal arteries bilaterally resulting in stenosis estimated at 50-70%.  A biphasic flow wave pattern is noted to the common femoral, superficial femoral, popliteal, anterior tibial, posterior tibial, dorsalis pedis arteries bilaterally.   Impression:   1. Extensive plaque formation at the superficial at the popliteal arteries bilaterally resulting in stenosis estimated at 50-75%.     FINDINGS - venous  There is normal compression and flow noted in the  common femoral vein, superficial femoral vein, popliteal vein, and  posterior tibial veinbilaterally.  No evidence of deep venous thrombosis is identified.  There is bilateral reflux at the greater saphenous veins.  Reflux time on the right is 1131 milliseconds and reflux time on the left is 987 milliseconds   Impression:   1.  No deep venous thrombosis identified to the lower extremities bilaterally   2.  Venous reflux at the greater saphenous veins bilaterally.  Microbiology: Cx obtained 5/10/23    HOME HEALTH AGENCY:  [facility]  TIMES PER WEEK/DAYS:  1 x weekly  WOUND CARE ORDERS:    Left lower leg wound: Cleanse escared areas and apply Bactroban PRN and cover with Kerlix and secure with tape to be changed daily  Follow up in about 1 week (around 10/3/2023) for legs.        Electronically signed:  Sarah Rodriguez NP

## 2023-10-02 ENCOUNTER — HOSPITAL ENCOUNTER (EMERGENCY)
Facility: HOSPITAL | Age: 78
Discharge: HOME | End: 2023-10-02
Attending: INTERNAL MEDICINE
Payer: MEDICARE

## 2023-10-02 VITALS
WEIGHT: 218 LBS | SYSTOLIC BLOOD PRESSURE: 158 MMHG | BODY MASS INDEX: 27.1 KG/M2 | TEMPERATURE: 98 F | RESPIRATION RATE: 27 BRPM | OXYGEN SATURATION: 95 % | HEART RATE: 61 BPM | DIASTOLIC BLOOD PRESSURE: 88 MMHG | HEIGHT: 75 IN

## 2023-10-02 DIAGNOSIS — R09.89 CHOKING EPISODE: ICD-10-CM

## 2023-10-02 DIAGNOSIS — R13.10 DYSPHAGIA, UNSPECIFIED TYPE: Primary | ICD-10-CM

## 2023-10-02 PROCEDURE — 99283 EMERGENCY DEPT VISIT LOW MDM: CPT | Mod: 25

## 2023-10-02 NOTE — ED PROVIDER NOTES
10/02/2023         11:55 AM    Source of History:  History obtained from the patient.     Chief complaint:  From Nurse Triage:  Foreign Body In Throat (C/o choking on rice while eating lunch and states feels like it's stuck in his throat)    HISTORY OF PRESENT ILLNES:  Drew Oliva is a 78 y.o. male  has a past medical history of Bipolar disorder, unspecified, Diabetes mellitus, Hyperlipidemia, Hypertension, and Thyroid disease. presenting with Foreign Body In Throat (C/o choking on rice while eating lunch and states feels like it's stuck in his throat)      REVIEW OF SYSTEMS:   Constitutional symptoms:  No Fever. No Chills    Skin symptoms:  No Rash.    Eye symptoms:  No Visual disturbance reported.   ENMT symptoms:  No Sore throat,  difficulty swallowing,  Respiratory symptoms:  No Shortness of Breath, no Cough, no Wheezing.    Cardiovascular symptoms:  No Chest Pain, No Palpitations.   Gastrointestinal symptoms:  No Abdominal Pain, No Nausea, No Vomiting, No Diarrhea, No Constipation.    Genitourinary symptoms:  No Dysuria,    Musculoskeletal symptoms:  No Back pain,    Neurologic symptoms:  No Headache, No Dizziness.    Psychiatric symptoms:  No Anxiety, No Depression, No Substance Abuse.              Additional review of systems information: Patient Denies Any Other Complaints.    All Other Systems Reviewed With Patient And Negative.    ALLEGIES:  Review of patient's allergies indicates:   Allergen Reactions    Sulfa (sulfonamide antibiotics)      Other reaction(s): itching       MEDICINE LIST:  Current Outpatient Medications   Medication Instructions    ARIPiprazole (ABILIFY) 15 MG Tab TAKE 1 TABLET BY MOUTH EVERY DAY AT BEDTIME FOR 90 DAYS    atorvastatin (LIPITOR) 40 mg, Oral    benztropine (COGENTIN) 0.5 mg, Oral, Nightly    cefixime (SUPRAX) 400 mg Cap SPRINKLE CONTENTS OF TWO CAPSULES DIRECTLY ONTO INFECTION SITE ONCE DAILY    clopidogreL (PLAVIX) 75 mg tablet No dose, route, or frequency recorded.     diltiaZEM (CARDIZEM) 120 mg, Oral    furosemide (LASIX) 20 mg, Oral    glimepiride (AMARYL) 4 mg, Oral, 2 times daily    HUMALOG KWIKPEN INSULIN 100 unit/mL pen INJECT 15 UNITS SUBCUTANEOUSLY THREE TIMES DAILY    hydrALAZINE (APRESOLINE) 50 mg, Oral, 3 times daily    LANTUS SOLOSTAR U-100 INSULIN glargine 100 units/mL SubQ pen SMARTSIG:15 Unit(s) SUB-Q Daily    levothyroxine (SYNTHROID) 25 mcg, Oral    metFORMIN (GLUCOPHAGE) 1,000 mg, Oral, 2 times daily    mupirocin (BACTROBAN) 2 % ointment Topical (Top), 3 times daily    olmesartan (BENICAR) 40 mg, Oral    tamsulosin (FLOMAX) 0.4 mg Cap 2 capsules, Oral        PMH:  As per HPI and below:    Reviewed and updated in chart.    PAST MEDICAL HISTORY:  Past Medical History:   Diagnosis Date    Bipolar disorder, unspecified     Diabetes mellitus     Hyperlipidemia     Hypertension     Thyroid disease         PAST SURGICAL HISTORY:  Past Surgical History:   Procedure Laterality Date    FOOT SURGERY Right     to fix broken foot    INSERTION, STENT, BARE METAL, ARTERY, PERIPHERAL Bilateral     Lower extremities    TOE SURGERY      removal of bone righ tgreat toe    TONSILLECTOMY AND ADENOIDECTOMY         SOCIAL HISTORY:  Social History     Tobacco Use    Smoking status: Never    Smokeless tobacco: Never   Substance Use Topics    Alcohol use: Not Currently    Drug use: Never       FAMILY HISTORY:  Family History   Problem Relation Age of Onset    No Known Problems Mother     Heart disease Father         PROBLEM LIST:  Patient Active Problem List   Diagnosis    Non-pressure chronic ulcer of left lower leg, limited to breakdown of skin    Non-pressure chronic ulcer right lower leg, limited to breakdown skin    Diabetic polyneuropathy associated with type 2 diabetes mellitus    Diabetic autonomic neuropathy associated with type 2 diabetes mellitus    Venous reflux    Arterial insufficiency of lower extremity    Peripheral vascular disease        PHYSICAL EXAM:      ED Triage  Vitals [10/02/23 1141]   BP (!) 176/64   Pulse 71   Resp 20   Temp 98.2 °F (36.8 °C)   SpO2 95 %        Vital Signs: Reviewed As In Chart.  General:  Alert, No Cardiorespiratory Distress Noted.   Eye:  Extraocular Movements Are Intact.   ENT: Mucus membranes are moist.  Spitting up mucus  Cardiovascular:  Regular Rate And Rhythm, No Murmur, No Pedal Edema.    Respiratory:  Respirations Nonlabored, No Respiratory Distress, Good Bilateral Air Entry, No Rales, No Rhonchi.    Gastrointestinal:  Soft, Non Distended, Non Tenderness, Normal Bowel Sounds.    Neurological:  Alert And Oriented To Person, Place, Time, And Situation, Normal Motor Observed, Normal Speech Observed.  Musculoskeletal:  No Gross Deformity Noted.     Psychiatric:  Cooperative.      ED WORKUP FOR MEDICAL DECISION MAKING:    ED ORDERS:  Orders Placed This Encounter   Procedures    X-Ray Chest PA And Lateral       ED MEDICINES:  Medications - No data to display             ED LABS ORDERED AND REVIEWED:  No visits with results within 1 Day(s) from this visit.   Latest known visit with results is:   Historical on 05/14/2018   Component Date Value Ref Range Status    FINAL CULTURE 05/14/2018 No anaerobes isolated   Final    GRAM STAIN 05/14/2018    Final                    Value:Rare WBCs   Moderate Gram Positive Cocci in clusters         RADIOLOGY STUDIES ORDERED AND REVIEWED:  Imaging Results              X-Ray Chest PA And Lateral (Final result)  Result time 10/02/23 13:02:43      Final result by Felix Hodges MD (10/02/23 13:02:43)                   Impression:      1. No active cardiopulmonary disease identified      Electronically signed by: Felix Hodges  Date:    10/02/2023  Time:    13:02               Narrative:    EXAMINATION:  XR CHEST PA AND LATERAL    CLINICAL HISTORY:  , Other specified symptoms and signs involving the circulatory and respiratory systems.    COMPARISON:  11/22/2017    FINDINGS:  PA/AP and lateral views reveal the heart  to be borderline enlarged.  The trachea is midline.  No definite infiltrate or effusion is seen.  Mild degenerative changes are noted to the thoracic spine.  There is interim removal of the right PICC line.                                      MEDICAL DECISION MAKING:    Drew Oliva is 78 y.o. male who  has a past medical history of Bipolar disorder, unspecified, Diabetes mellitus, Hyperlipidemia, Hypertension, and Thyroid disease. arrives in ER with c/o Foreign Body In Throat (C/o choking on rice while eating lunch and states feels like it's stuck in his throat)      Reviewed Nurses Note. Reviewed Vital Signs.     Reviewed Pertinent old records, History and updated as necessary.    Vitals:    10/02/23 1331   BP: (!) 158/88   Pulse: 61   Resp: (!) 27   Temp:         Medical Decision Making  I am going to do a two-view chest x-ray 1st on him and then will decide about if we need to do CT chest or barium swallow on him.    Amount and/or Complexity of Data Reviewed  Radiology: ordered.              ED Course as of 10/02/23 1353   Mon Oct 02, 2023   1352 Patient is feeling good now, I gave him water he was able to swallow the water without any problem, he is not having any choking sensation anymore he is not spitting up anymore.  I will let him go home. [GQ]      ED Course User Index  [GQ] Irwin Brady MD            PROCEDURES PERFORMED IN ED:  Procedures    DIAGNOSTIC IMPRESSION:        ICD-10-CM ICD-9-CM   1. Dysphagia, unspecified type  R13.10 787.20   2. Choking episode  R09.89 784.99         ED Disposition Condition    Discharge Stable               Medication List        ASK your doctor about these medications      ARIPiprazole 15 MG Tab  Commonly known as: ABILIFY     atorvastatin 40 MG tablet  Commonly known as: LIPITOR     benztropine 0.5 MG tablet  Commonly known as: COGENTIN     cefixime 400 mg Cap  Commonly known as: SUPRAX     clopidogreL 75 mg tablet  Commonly known as: PLAVIX     diltiaZEM 120  MG tablet  Commonly known as: CARDIZEM     furosemide 20 MG tablet  Commonly known as: LASIX     glimepiride 4 MG tablet  Commonly known as: AMARYL     HumaLOG KwikPen Insulin 100 unit/mL pen  Generic drug: insulin lispro     hydrALAZINE 50 MG tablet  Commonly known as: APRESOLINE     LANTUS SOLOSTAR U-100 INSULIN glargine 100 units/mL SubQ pen  Generic drug: insulin     levothyroxine 25 MCG tablet  Commonly known as: SYNTHROID     metFORMIN 1000 MG tablet  Commonly known as: GLUCOPHAGE     mupirocin 2 % ointment  Commonly known as: BACTROBAN     olmesartan 40 MG tablet  Commonly known as: BENICAR     tamsulosin 0.4 mg Cap  Commonly known as: FLOMAX                Follow-up Information       Pietro Kaur MD In 2 days.    Specialty: Internal Medicine  Contact information:  [address] [place] [address]  [employer]  [facility] [address]  [phone]                              ED Prescriptions    None       Follow-up Information       Follow up With Specialties Details Why Contact Info    Pietro Kaur MD Internal Medicine In 2 days  [address] [place] [address]  [employer]  [facility] [address]  [phone]                 Irwin Brady MD  10/02/23 0366

## 2023-10-03 ENCOUNTER — HOSPITAL ENCOUNTER (OUTPATIENT)
Dept: WOUND CARE | Facility: HOSPITAL | Age: 78
Discharge: HOME | End: 2023-10-03
Attending: NURSE PRACTITIONER
Payer: MEDICARE

## 2023-10-03 VITALS
HEART RATE: 80 BPM | RESPIRATION RATE: 18 BRPM | HEIGHT: 75 IN | SYSTOLIC BLOOD PRESSURE: 125 MMHG | BODY MASS INDEX: 27.1 KG/M2 | DIASTOLIC BLOOD PRESSURE: 60 MMHG | WEIGHT: 218 LBS

## 2023-10-03 DIAGNOSIS — E11.42 DIABETIC POLYNEUROPATHY ASSOCIATED WITH TYPE 2 DIABETES MELLITUS: ICD-10-CM

## 2023-10-03 DIAGNOSIS — E11.43 DIABETIC AUTONOMIC NEUROPATHY ASSOCIATED WITH TYPE 2 DIABETES MELLITUS: ICD-10-CM

## 2023-10-03 DIAGNOSIS — L97.921 NON-PRESSURE CHRONIC ULCER OF LEFT LOWER LEG, LIMITED TO BREAKDOWN OF SKIN: Primary | ICD-10-CM

## 2023-10-03 DIAGNOSIS — I87.2 VENOUS REFLUX: ICD-10-CM

## 2023-10-03 DIAGNOSIS — I73.9 PERIPHERAL VASCULAR DISEASE: ICD-10-CM

## 2023-10-03 DIAGNOSIS — I73.9 ARTERIAL INSUFFICIENCY OF LOWER EXTREMITY: ICD-10-CM

## 2023-10-03 DIAGNOSIS — L97.911 NON-PRESSURE CHRONIC ULCER RIGHT LOWER LEG, LIMITED TO BREAKDOWN SKIN: ICD-10-CM

## 2023-10-03 PROCEDURE — 99213 OFFICE O/P EST LOW 20 MIN: CPT

## 2023-10-03 PROCEDURE — 27000999 HC MEDICAL RECORD PHOTO DOCUMENTATION

## 2023-10-03 NOTE — PROGRESS NOTES
Subjective:       Patient ID: Drew Oliva is a 78 y.o. male.    Chief Complaint: Arterial Wound Follow Up (Left lower leg)    HPI    5/10/23 - Mr. Oliva was referred to clinic by his PCP, Dr. Kaur.  The patient reports that in March, 2023, he tripped and fell at home and broke to ribs.  He healed from that situation, however, shortly afterwards he noticed that his lower legs began to swell and blisters began developing bilaterally.  He went to Dr. Kaur who felt that perhaps it was impact ago.  He was given Keflex in early April.  The wounds did not heal so the patient returned to Dr. Spears on 05/04/2023 and at that time he was referred to wound clinic.    He is a diabetic.  His last A1c was 6.6 in January, 2023.  His blood sugar today was 221.  A [place] Dragan was performed today with 0/10 sensation.  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  Doppler U/S were done with weak petal pulses, and unable to obtain posterior tibial pulses.  The patient has been referred for Cardiovascular U/S which will be done on 5/15/23 and 11:30 am.  He has a history of renal U/S, however, he has no cardiovascular history noted.    His legs were assessed today.  There are scattered open wounds at the lower bilateral legs.  A culture was obtained from the wounds on the right lower extremity.  He was given Hibiclens to wash the legs x 3 days and we will use silver alginate until the culture and U/S are reviewed.  He does see podiatrist, Dr. Mehta, in [place] for a large callus at the right great toe metatarsal head and for toe nail clipping.     5/17/23 - Mr. Oliva returns today for follow up on the bilateral lower extremity wounds.  He did have U/S done on 5/10/23 (results below) which show mixed peripheral vascular disease with bilateral stenosis of 50-75% throughout the superficial and popliteal arteries as well as venous reflux as the greater saphenous veins bilaterally.   We did discuss his vascular disease and he has requested a referral to a vascular surgeon, Dr. Felix Drake (05/23 @ 12:30).  He also does not have any wound for cardiovascular, so we did discuss a referral to Dr. Reyes while he is at Dr. Drake's office.     Additionally, we reviewed his DNA culture which was obtained on 05/10/2023.  It is positive for multiple pathogens the most prevalent of which is enterococcus at 90%.  We will be requesting a recommendation for topical antibiotics through [facility] and he has been prescribed doxycycline 100 mg b.i.d. times 10 days.  He was educated on the vascular system and encouraged to, for the time being, avoid any constriction on the lower extremities cleared by Dr. Drake.  For the time being we will use silver alginate in a Kerlix wrap.  He has used Hibiclens x 3 days as requested and his legs do appear somewhat improved.  He will return to clinic in 1 week.    5/24/23 - The patient returns today for f/up on his bilateral lower extremity wounds.  He was seen by Dr. Drake 5/24/23, started on Plavix.  He will have his first procedure done on the right leg on Wednesday, 5/31/23.  We are unable to compress at this time until he is cleared.  Today the wounds were selectively debrided.  There is an increased number of blisters that have developed since his last visit.  The legs will be wrapped lightly with kerlix.  Additionally his topical abx were ordered last week.  They were not at clinic at the time of his appointment, however, he did pick those up by early afternoon.  He was educated on their use at the time of his visit.  He also continues to take his Doxy as ordered.     6/7/23 - Mr. Oliva returns today for followup on the bilateral lower extremity wounds.  He did have a procedure done by Dr. Felix Drake on 5/31/23.  Dr. Drake's note dated 05/23/2023 states that the patient has bilateral mixed wounds and will need bilateral staged arterial PTA days.  The plan  was to do the right in several days.  This some option is that the procedure that was performed on 05/31/2023 was that procedure, however, the patient states that he was told that there was no blockage in the right leg.  He also has a procedure scheduled on the left leg tomorrow, 06/08/2023.  He does have a new area of concern to the right leg appears to be a tape blister.  He also has a small area of concern at the right great toe metatarsal head.  He does have diabetes, however, he is followed by a podiatrist in [place].  A callus paring was done on this area today because his shoes were offer assessment.  There is an open wound that measures 0.4 x 0.4 x 0.2 cm.  We will continue to monitor, however, he prefers to have his podiatrist continue to follow that wound since he has been followed by him for several years.  He does have evidence of hammertoe and has had previous surgical intervention.  He continues to use topical abx (Cefepime) which was stared on 5/24/23.  We will d/c the topicals in one month, on or about 6/24/23 or his nearest appt.     6/13/23 - the patient returns today for followup on his bilateral lower extremities.  He did have a right leg arterial procedure performed a proximally 2 weeks ago, right leg venous procedure last week was completed, and tomorrow he is scheduled to have a left leg venous procedure performed.  Dr. Felix Drake is his vascular surgeon.  He was told that the arteries in the left leg all looks good.  He continues to use the topical antibiotics (Cefepime) with bassa gel, and has used them since 5/24/23.  The topicals should be discontinued at his next visit if the wounds appear stable.  Today there was no increase in drainage and all wounds do appear to be drying up nicely with the use of the topicals.    8/17/23 - Mr. Oliva returns today for wounds on his bilateral lower extremity.  He was last seen at clinic on 07/03/2023 at that time he was discharged.  His called  today after noticing areas that appeared to be blisters on his bilateral lower legs.  He has followed up with his vascular surgeon, Dr. Felix Drake in the last couple of weeks and is scheduled for followup with him in 6 months.    Additionally, he is currently taking p.o. Augmentin which was prescribed by his podiatrist, Dr. Mehta for a foot wound.  He sees Dr. Mehta on Thursdays.  Dr. Mehta will continue monitoring and managing that wound.  Today he has 2 very small open areas on the left lower extremity and 1 on the right lower extremity.  Go incidentally, the on the right and left are almost the same identifiable location at the front of the shin.  We discussed the possibility that he bumped into an object.  He is not sure if that occurred or not, however, the wife did see a blister on the very small 2nd wound on the left leg.  Today, we applied alginate and they will continue to use silver alginate daily and we will reassess in 1 week.  She was instructed to take photos if any blister should develop over the next week.    8/24/23 - The patient returns today for several small scattered wounds on his bilateral lower extremities.  His wife reports that she noticed some new blisters on the left leg on 08/23/2023.  He has completed his Augmentin.  Today, those wounds were assessed and mechanically debrided.   She reports that they begin as small blisters, today, however, they are very small open wounds.  There are scattered on both lower extremities and he denies any itching, scratching, etc.  He has no edema and in fact the legs are quite thin so it is unlikely that these are venous in nature.  She does continue to apply mupirocin and she was instructed to continue to do so.  They were given Hibiclens to wash the legs for 3 days and then instructed to begin washing daily with Dial soap.  For now, we will continue to monitor.  They will return in 2 weeks or sooner if the wound deteriorates.   Of note, he  "had an appt with Dr. Mehta (podiatrist) this morning and were told that the foot is "100% better".     9/6/23 - Mr. Oliva is seen today for followup on the wounds to his bilateral lower extremities.  He has been using mupirocin on the very small areas of irritation.  Today, the majority of those wounds are now closed.  He will follow up in 3 months and return sooner if he has any further issues.    9/26/23 - The patient returns today for followup on his left lower extremity which today has several scattered small wounds.  At his last visit there were no wounds open and he was to return in a couple of months.  However, his wife reports that over the last week new ulcers started erupted and burst thing, particularly on the left lower extremity.  She has been applying Bactroban to these areas which seems to be effective.  Because she has been using Bactroban no culture was obtained.  The wounds were wrapped with Kerlix and he will return in 1 week.    10/3/23 - Mr. Oliva returns today with the scattered wounds to his bilateral lower extremities considerably improved.  He continues to apply Mupirocin to the small open areas, many of which have resolved since his last visit.    Of note, he visited ED yesterday due to choking on rice. Chest x-ray was negative, he swallowed his food while he was at the ED and was sent home. Today he has no remaining issues.  He will return in two weeks for follow up.       Review of Systems      Objective:      Vitals:    10/03/23 1127   BP: 125/60   Pulse: 80   Resp: 18       Physical Exam       Altered Skin Integrity 09/26/23 1324 Left lower Leg #2 (Active)   09/26/23 1324   Altered Skin Integrity Present on Admission - Did Patient arrive to the hospital with altered skin?: yes   Side: Left   Orientation: lower   Location: Leg   Wound Number: #2   Is this injury device related?: No   Primary Wound Type:    Description of Altered Skin Integrity:    Ankle-Brachial Index:  "   Pulses:    Removal Indication and Assessment:    Wound Outcome:    (Retired) Wound Length (cm):    (Retired) Wound Width (cm):    (Retired) Depth (cm):    Wound Description (Comments):    Removal Indications:    Dressing Appearance Moist drainage 10/03/23 1128   Drainage Amount Moderate 10/03/23 1128   Drainage Characteristics/Odor Serosanguineous 10/03/23 1128   Appearance Pink;Red;Moist 10/03/23 1128   Tissue loss description Full thickness 10/03/23 1128   Periwound Area [place];Dry 10/03/23 1128   Wound Edges Irregular 10/03/23 1128   Wound Length (cm) 14 cm 10/03/23 1128   Wound Width (cm) 9.2 cm 10/03/23 1128   Wound Depth (cm) 0.2 cm 10/03/23 1128   Wound Volume (cm^3) 25.76 cm^3 10/03/23 1128   Wound Surface Area (cm^2) 128.8 cm^2 10/03/23 1128   Care Cleansed with:;Wound cleanser 10/03/23 1128   Dressing Applied 10/03/23 1128   Dressing Change Due 10/04/23 10/03/23 1128   10/3/23          Left lower anterior leg (pre)                         Left lower medial leg (pre)                           Right lower anterior leg (pre)   (STACIE)                                                                                                                               (STACIE)       Assessment:         ICD-10-CM ICD-9-CM   1. Non-pressure chronic ulcer of left lower leg, limited to breakdown of skin  L97.921 707.10   2. Non-pressure chronic ulcer right lower leg, limited to breakdown skin  L97.911 707.10   3. Peripheral vascular disease  I73.9 443.9   4. Arterial insufficiency of lower extremity  I73.9 443.9   5. Venous reflux  I87.2 459.81   6. Diabetic autonomic neuropathy associated with type 2 diabetes mellitus  E11.43 250.60     337.1   7. Diabetic polyneuropathy associated with type 2 diabetes mellitus  E11.42 250.60     357.2         Procedures:     Mechanical debridement only    [] Yes [] No   I & D performed  [] Yes [] No   Excisional debridement performed  [] Yes [] No   Selective debridement performed            [x] Yes [] No   Mechanical debridement performed         [] Yes [] No  Silver nitrate applied                                     [] Yes [] No  Labs ordered this visit                                  [] Yes [] No   Imaging ordered this visit                           [] Yes [] No   Tissue pathology and/or culture taken         MEDICATIONS    Current Outpatient Medications:     ARIPiprazole (ABILIFY) 15 MG Tab, TAKE 1 TABLET BY MOUTH EVERY DAY AT BEDTIME FOR 90 DAYS, Disp: , Rfl:     atorvastatin (LIPITOR) 40 MG tablet, Take 40 mg by mouth., Disp: , Rfl:     benztropine (COGENTIN) 0.5 MG tablet, Take 0.5 mg by mouth every evening., Disp: , Rfl:     cefixime (SUPRAX) 400 mg Cap, SPRINKLE CONTENTS OF TWO CAPSULES DIRECTLY ONTO INFECTION SITE ONCE DAILY, Disp: , Rfl:     clopidogreL (PLAVIX) 75 mg tablet, , Disp: , Rfl:     diltiaZEM (CARDIZEM) 120 MG tablet, Take 120 mg by mouth., Disp: , Rfl:     furosemide (LASIX) 20 MG tablet, Take 20 mg by mouth., Disp: , Rfl:     glimepiride (AMARYL) 4 MG tablet, Take 4 mg by mouth 2 (two) times daily., Disp: , Rfl:     HUMALOG KWIKPEN INSULIN 100 unit/mL pen, INJECT 15 UNITS SUBCUTANEOUSLY THREE TIMES DAILY, Disp: , Rfl:     hydrALAZINE (APRESOLINE) 50 MG tablet, Take 50 mg by mouth 3 (three) times daily., Disp: , Rfl:     LANTUS SOLOSTAR U-100 INSULIN glargine 100 units/mL SubQ pen, SMARTSIG:15 Unit(s) SUB-Q Daily, Disp: , Rfl:     levothyroxine (SYNTHROID) 25 MCG tablet, Take 25 mcg by mouth., Disp: , Rfl:     metFORMIN (GLUCOPHAGE) 1000 MG tablet, Take 1,000 mg by mouth 2 (two) times daily., Disp: , Rfl:     mupirocin (BACTROBAN) 2 % ointment, Apply topically 3 (three) times daily., Disp: , Rfl:     olmesartan (BENICAR) 40 MG tablet, Take 40 mg by mouth., Disp: , Rfl:     tamsulosin (FLOMAX) 0.4 mg Cap, Take 2 capsules by mouth., Disp: , Rfl:    Review of patient's allergies indicates:   Allergen Reactions    Sulfa (sulfonamide antibiotics)      Other reaction(s): itching  "      DIAGNOSTICS      Labs/Scans/Micro:    CBC:   Lab Results   Component Value Date    WBC 8.6 05/11/2018    HGB 13.8 (L) 05/11/2018    HCT 40.0 (L) 05/11/2018    MCV 86 05/11/2018     (H) 05/11/2018       Sedimentation rate:   Lab Results   Component Value Date    SEDRATE 45 (H) 11/27/2017      C-reactive protein: No results found for: "CRP" Chemistry: [unfilled]  HBA1C: No components found for: "HBA1C"    Ankle Brachial Index: No results found for this or any previous visit.      Imaging:    Plain film: No results found for this or any previous visit.    MRI: No results found for this or any previous visit.   No results found for this or any previous visit.    Bone Scan: No results found for this or any previous visit.   No results found for this or any previous visit.      Vascular studies: 05/15:    FINDINGS - arterial   Ultrasound Doppler and color flow imaging were performed on the arteries of the lower extremities bilaterally. There is extensive scattered plaque formation throughout the superficial and popliteal arteries bilaterally resulting in stenosis estimated at 50-70%.  A biphasic flow wave pattern is noted to the common femoral, superficial femoral, popliteal, anterior tibial, posterior tibial, dorsalis pedis arteries bilaterally.   Impression:   1. Extensive plaque formation at the superficial at the popliteal arteries bilaterally resulting in stenosis estimated at 50-75%.     FINDINGS - venous  There is normal compression and flow noted in the  common femoral vein, superficial femoral vein, popliteal vein, and  posterior tibial veinbilaterally.  No evidence of deep venous thrombosis is identified.  There is bilateral reflux at the greater saphenous veins.  Reflux time on the right is 1131 milliseconds and reflux time on the left is 987 milliseconds   Impression:   1.  No deep venous thrombosis identified to the lower extremities bilaterally   2.  Venous reflux at the greater saphenous " veins bilaterally.  Microbiology: Cx obtained 5/10/23    HOME HEALTH AGENCY:  [facility]  TIMES PER WEEK/DAYS:  1 x weekly  WOUND CARE ORDERS:    Left lower leg wound: Cleanse escared areas and apply Bactroban daily, may leave FLAVIA  Follow up in 2 weeks (on 10/17/2023) for left leg.        Electronically signed:  Sarah Rodriguez NP

## 2023-10-17 ENCOUNTER — HOSPITAL ENCOUNTER (OUTPATIENT)
Dept: WOUND CARE | Facility: HOSPITAL | Age: 78
Discharge: HOME | End: 2023-10-17
Attending: NURSE PRACTITIONER
Payer: MEDICARE

## 2023-10-17 VITALS
SYSTOLIC BLOOD PRESSURE: 170 MMHG | RESPIRATION RATE: 18 BRPM | HEIGHT: 75 IN | BODY MASS INDEX: 27.1 KG/M2 | DIASTOLIC BLOOD PRESSURE: 74 MMHG | HEART RATE: 74 BPM | WEIGHT: 218 LBS

## 2023-10-17 DIAGNOSIS — I73.9 ARTERIAL INSUFFICIENCY OF LOWER EXTREMITY: ICD-10-CM

## 2023-10-17 DIAGNOSIS — L97.911 NON-PRESSURE CHRONIC ULCER RIGHT LOWER LEG, LIMITED TO BREAKDOWN SKIN: ICD-10-CM

## 2023-10-17 DIAGNOSIS — Z51.89 VISIT FOR WOUND CHECK: ICD-10-CM

## 2023-10-17 DIAGNOSIS — I87.2 VENOUS REFLUX: ICD-10-CM

## 2023-10-17 DIAGNOSIS — I73.9 PERIPHERAL VASCULAR DISEASE: ICD-10-CM

## 2023-10-17 DIAGNOSIS — L97.921 NON-PRESSURE CHRONIC ULCER OF LEFT LOWER LEG, LIMITED TO BREAKDOWN OF SKIN: Primary | ICD-10-CM

## 2023-10-17 DIAGNOSIS — E11.43 DIABETIC AUTONOMIC NEUROPATHY ASSOCIATED WITH TYPE 2 DIABETES MELLITUS: ICD-10-CM

## 2023-10-17 DIAGNOSIS — E11.42 DIABETIC POLYNEUROPATHY ASSOCIATED WITH TYPE 2 DIABETES MELLITUS: ICD-10-CM

## 2023-10-17 PROCEDURE — 99212 OFFICE O/P EST SF 10 MIN: CPT

## 2023-10-17 PROCEDURE — 27000999 HC MEDICAL RECORD PHOTO DOCUMENTATION

## 2023-10-17 NOTE — PROGRESS NOTES
Patient ID: Drew Oliva is a 78 y.o. male.    Chief Complaint: Arterial Wound Follow Up (Left lower leg)    HPI    5/10/23 - Mr. Oliva was referred to clinic by his PCP, Dr. Kaur.  The patient reports that in March, 2023, he tripped and fell at home and broke to ribs.  He healed from that situation, however, shortly afterwards he noticed that his lower legs began to swell and blisters began developing bilaterally.  He went to Dr. Kaur who felt that perhaps it was impact ago.  He was given Keflex in early April.  The wounds did not heal so the patient returned to Dr. Spears on 05/04/2023 and at that time he was referred to wound clinic.    He is a diabetic.  His last A1c was 6.6 in January, 2023.  His blood sugar today was 221.  A [place] Dragan was performed today with 0/10 sensation.  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  Doppler U/S were done with weak petal pulses, and unable to obtain posterior tibial pulses.  The patient has been referred for Cardiovascular U/S which will be done on 5/15/23 and 11:30 am.  He has a history of renal U/S, however, he has no cardiovascular history noted.    His legs were assessed today.  There are scattered open wounds at the lower bilateral legs.  A culture was obtained from the wounds on the right lower extremity.  He was given Hibiclens to wash the legs x 3 days and we will use silver alginate until the culture and U/S are reviewed.  He does see podiatrist, Dr. Mehta, in [place] for a large callus at the right great toe metatarsal head and for toe nail clipping.     5/17/23 - Mr. Oliva returns today for follow up on the bilateral lower extremity wounds.  He did have U/S done on 5/10/23 (results below) which show mixed peripheral vascular disease with bilateral stenosis of 50-75% throughout the superficial and popliteal arteries as well as venous reflux as the greater saphenous veins bilaterally.  We did discuss  his vascular disease and he has requested a referral to a vascular surgeon, Dr. Felix Drake (05/23 @ 12:30).  He also does not have any wound for cardiovascular, so we did discuss a referral to Dr. Reyes while he is at Dr. Drake's office.     Additionally, we reviewed his DNA culture which was obtained on 05/10/2023.  It is positive for multiple pathogens the most prevalent of which is enterococcus at 90%.  We will be requesting a recommendation for topical antibiotics through [facility] and he has been prescribed doxycycline 100 mg b.i.d. times 10 days.  He was educated on the vascular system and encouraged to, for the time being, avoid any constriction on the lower extremities cleared by Dr. Drake.  For the time being we will use silver alginate in a Kerlix wrap.  He has used Hibiclens x 3 days as requested and his legs do appear somewhat improved.  He will return to clinic in 1 week.    5/24/23 - The patient returns today for f/up on his bilateral lower extremity wounds.  He was seen by Dr. Drake 5/24/23, started on Plavix.  He will have his first procedure done on the right leg on Wednesday, 5/31/23.  We are unable to compress at this time until he is cleared.  Today the wounds were selectively debrided.  There is an increased number of blisters that have developed since his last visit.  The legs will be wrapped lightly with kerlix.  Additionally his topical abx were ordered last week.  They were not at clinic at the time of his appointment, however, he did pick those up by early afternoon.  He was educated on their use at the time of his visit.  He also continues to take his Doxy as ordered.     6/7/23 - Mr. Oliva returns today for followup on the bilateral lower extremity wounds.  He did have a procedure done by Dr. Felix Drake on 5/31/23.  Dr. Drake's note dated 05/23/2023 states that the patient has bilateral mixed wounds and will need bilateral staged arterial PTA days.  The plan was to do the  right in several days.  This some option is that the procedure that was performed on 05/31/2023 was that procedure, however, the patient states that he was told that there was no blockage in the right leg.  He also has a procedure scheduled on the left leg tomorrow, 06/08/2023.  He does have a new area of concern to the right leg appears to be a tape blister.  He also has a small area of concern at the right great toe metatarsal head.  He does have diabetes, however, he is followed by a podiatrist in [place].  A callus paring was done on this area today because his shoes were offer assessment.  There is an open wound that measures 0.4 x 0.4 x 0.2 cm.  We will continue to monitor, however, he prefers to have his podiatrist continue to follow that wound since he has been followed by him for several years.  He does have evidence of hammertoe and has had previous surgical intervention.  He continues to use topical abx (Cefepime) which was stared on 5/24/23.  We will d/c the topicals in one month, on or about 6/24/23 or his nearest appt.     6/13/23 - the patient returns today for followup on his bilateral lower extremities.  He did have a right leg arterial procedure performed a proximally 2 weeks ago, right leg venous procedure last week was completed, and tomorrow he is scheduled to have a left leg venous procedure performed.  Dr. Felix Drake is his vascular surgeon.  He was told that the arteries in the left leg all looks good.  He continues to use the topical antibiotics (Cefepime) with bassa gel, and has used them since 5/24/23.  The topicals should be discontinued at his next visit if the wounds appear stable.  Today there was no increase in drainage and all wounds do appear to be drying up nicely with the use of the topicals.    8/17/23 - Mr. Oliva returns today for wounds on his bilateral lower extremity.  He was last seen at clinic on 07/03/2023 at that time he was discharged.  His called today after  noticing areas that appeared to be blisters on his bilateral lower legs.  He has followed up with his vascular surgeon, Dr. Felix Drake in the last couple of weeks and is scheduled for followup with him in 6 months.    Additionally, he is currently taking p.o. Augmentin which was prescribed by his podiatrist, Dr. Mehta for a foot wound.  He sees Dr. Mehta on Thursdays.  Dr. Mehta will continue monitoring and managing that wound.  Today he has 2 very small open areas on the left lower extremity and 1 on the right lower extremity.  Go incidentally, the on the right and left are almost the same identifiable location at the front of the shin.  We discussed the possibility that he bumped into an object.  He is not sure if that occurred or not, however, the wife did see a blister on the very small 2nd wound on the left leg.  Today, we applied alginate and they will continue to use silver alginate daily and we will reassess in 1 week.  She was instructed to take photos if any blister should develop over the next week.    8/24/23 - The patient returns today for several small scattered wounds on his bilateral lower extremities.  His wife reports that she noticed some new blisters on the left leg on 08/23/2023.  He has completed his Augmentin.  Today, those wounds were assessed and mechanically debrided.   She reports that they begin as small blisters, today, however, they are very small open wounds.  There are scattered on both lower extremities and he denies any itching, scratching, etc.  He has no edema and in fact the legs are quite thin so it is unlikely that these are venous in nature.  She does continue to apply mupirocin and she was instructed to continue to do so.  They were given Hibiclens to wash the legs for 3 days and then instructed to begin washing daily with Dial soap.  For now, we will continue to monitor.  They will return in 2 weeks or sooner if the wound deteriorates.   Of note, he had an appt  "with Dr. Mehta (podiatrist) this morning and were told that the foot is "100% better".     9/6/23 - Mr. Oliva is seen today for followup on the wounds to his bilateral lower extremities.  He has been using mupirocin on the very small areas of irritation.  Today, the majority of those wounds are now closed.  He will follow up in 3 months and return sooner if he has any further issues.    9/26/23 - The patient returns today for followup on his left lower extremity which today has several scattered small wounds.  At his last visit there were no wounds open and he was to return in a couple of months.  However, his wife reports that over the last week new ulcers started erupted and burst thing, particularly on the left lower extremity.  She has been applying Bactroban to these areas which seems to be effective.  Because she has been using Bactroban no culture was obtained.  The wounds were wrapped with Kerlix and he will return in 1 week.    10/3/23 - Mr. Oliva returns today with the scattered wounds to his bilateral lower extremities considerably improved.  He continues to apply Mupirocin to the small open areas, many of which have resolved since his last visit.    Of note, he visited ED yesterday due to choking on rice. Chest x-ray was negative, he swallowed his food while he was at the ED and was sent home. Today he has no remaining issues.  He will return in two weeks for follow up.     10/17/23 - the patient returns today for followup on his bilateral lower extremities on which he previously had very small scattered wounds.  He was last seen on 10/03/2023 at which time the majority of the scattered wounds had improved.  However, his wife called a couple of days later requesting antibiotics because the legs had broken out.  She was requested to continue application of mupirocin and was scheduled for today.  Today, they returned and all areas of concern on both legs are now resolved.  He will be scheduled " for 1 month followup and they were told to call to schedule should he have any issues prior to that date.      Subjective:         Review of Systems      Objective:      Vitals:    10/17/23 1057   BP: (!) 170/74   Pulse: 74   Resp: 18       Physical Exam       Altered Skin Integrity 09/26/23 1324 Left lower Leg #2 (Active)   09/26/23 1324   Altered Skin Integrity Present on Admission - Did Patient arrive to the hospital with altered skin?: yes   Side: Left   Orientation: lower   Location: Leg   Wound Number: #2   Is this injury device related?: No   Primary Wound Type:    Description of Altered Skin Integrity:    Ankle-Brachial Index:    Pulses:    Removal Indication and Assessment:    Wound Outcome:    (Retired) Wound Length (cm):    (Retired) Wound Width (cm):    (Retired) Depth (cm):    Wound Description (Comments):    Removal Indications:    Dressing Appearance Dry 10/17/23 1101   Drainage Amount None 10/17/23 1101   Periwound Area Intact 10/17/23 1101   Wound Length (cm) 0.1 cm 10/17/23 1101   Wound Width (cm) 0.1 cm 10/17/23 1101   Wound Depth (cm) 0.1 cm 10/17/23 1101   Wound Volume (cm^3) 0.001 cm^3 10/17/23 1101   Wound Surface Area (cm^2) 0.01 cm^2 10/17/23 1101     10/17/23    Left/Right leg    Assessment:         ICD-10-CM ICD-9-CM   1. Non-pressure chronic ulcer of left lower leg, limited to breakdown of skin  L97.921 707.10   2. Non-pressure chronic ulcer right lower leg, limited to breakdown skin  L97.911 707.10   3. Peripheral vascular disease  I73.9 443.9   4. Arterial insufficiency of lower extremity  I73.9 443.9   5. Venous reflux  I87.2 459.81   6. Diabetic autonomic neuropathy associated with type 2 diabetes mellitus  E11.43 250.60     337.1   7. Diabetic polyneuropathy associated with type 2 diabetes mellitus  E11.42 250.60     357.2   8. Visit for wound check  Z51.89 V58.89           Procedures:     Wound check only    [] Yes [] No   I & D performed  [] Yes [] No   Excisional debridement  performed  [] Yes [] No   Selective debridement performed           [] Yes [] No   Mechanical debridement performed         [] Yes [] No  Silver nitrate applied                                     [] Yes [] No  Labs ordered this visit                                  [] Yes [] No   Imaging ordered this visit                           [] Yes [] No   Tissue pathology and/or culture taken         MEDICATIONS    Current Outpatient Medications:     ARIPiprazole (ABILIFY) 15 MG Tab, TAKE 1 TABLET BY MOUTH EVERY DAY AT BEDTIME FOR 90 DAYS, Disp: , Rfl:     atorvastatin (LIPITOR) 40 MG tablet, Take 40 mg by mouth., Disp: , Rfl:     benztropine (COGENTIN) 0.5 MG tablet, Take 0.5 mg by mouth every evening., Disp: , Rfl:     cefixime (SUPRAX) 400 mg Cap, SPRINKLE CONTENTS OF TWO CAPSULES DIRECTLY ONTO INFECTION SITE ONCE DAILY, Disp: , Rfl:     clopidogreL (PLAVIX) 75 mg tablet, , Disp: , Rfl:     diltiaZEM (CARDIZEM) 120 MG tablet, Take 120 mg by mouth., Disp: , Rfl:     furosemide (LASIX) 20 MG tablet, Take 20 mg by mouth., Disp: , Rfl:     glimepiride (AMARYL) 4 MG tablet, Take 4 mg by mouth 2 (two) times daily., Disp: , Rfl:     HUMALOG KWIKPEN INSULIN 100 unit/mL pen, INJECT 15 UNITS SUBCUTANEOUSLY THREE TIMES DAILY, Disp: , Rfl:     hydrALAZINE (APRESOLINE) 50 MG tablet, Take 50 mg by mouth 3 (three) times daily., Disp: , Rfl:     LANTUS SOLOSTAR U-100 INSULIN glargine 100 units/mL SubQ pen, SMARTSIG:15 Unit(s) SUB-Q Daily, Disp: , Rfl:     levothyroxine (SYNTHROID) 25 MCG tablet, Take 25 mcg by mouth., Disp: , Rfl:     metFORMIN (GLUCOPHAGE) 1000 MG tablet, Take 1,000 mg by mouth 2 (two) times daily., Disp: , Rfl:     mupirocin (BACTROBAN) 2 % ointment, Apply topically 3 (three) times daily., Disp: , Rfl:     olmesartan (BENICAR) 40 MG tablet, Take 40 mg by mouth., Disp: , Rfl:     tamsulosin (FLOMAX) 0.4 mg Cap, Take 2 capsules by mouth., Disp: , Rfl:    Review of patient's allergies indicates:   Allergen Reactions     "Sulfa (sulfonamide antibiotics)      Other reaction(s): itching       DIAGNOSTICS      Labs/Scans/Micro:    CBC:   Lab Results   Component Value Date    WBC 8.6 05/11/2018    HGB 13.8 (L) 05/11/2018    HCT 40.0 (L) 05/11/2018    MCV 86 05/11/2018     (H) 05/11/2018       Sedimentation rate:   Lab Results   Component Value Date    SEDRATE 45 (H) 11/27/2017      C-reactive protein: No results found for: "CRP" Chemistry: [unfilled]  HBA1C: No components found for: "HBA1C"    Ankle Brachial Index: No results found for this or any previous visit.      Imaging:    Plain film: No results found for this or any previous visit.    MRI: No results found for this or any previous visit.   No results found for this or any previous visit.    Bone Scan: No results found for this or any previous visit.   No results found for this or any previous visit.      Vascular studies: 05/15:    FINDINGS - arterial   Ultrasound Doppler and color flow imaging were performed on the arteries of the lower extremities bilaterally. There is extensive scattered plaque formation throughout the superficial and popliteal arteries bilaterally resulting in stenosis estimated at 50-70%.  A biphasic flow wave pattern is noted to the common femoral, superficial femoral, popliteal, anterior tibial, posterior tibial, dorsalis pedis arteries bilaterally.   Impression:   1. Extensive plaque formation at the superficial at the popliteal arteries bilaterally resulting in stenosis estimated at 50-75%.     FINDINGS - venous  There is normal compression and flow noted in the  common femoral vein, superficial femoral vein, popliteal vein, and  posterior tibial veinbilaterally.  No evidence of deep venous thrombosis is identified.  There is bilateral reflux at the greater saphenous veins.  Reflux time on the right is 1131 milliseconds and reflux time on the left is 987 milliseconds   Impression:   1.  No deep venous thrombosis identified to the lower " extremities bilaterally   2.  Venous reflux at the greater saphenous veins bilaterally.  Microbiology: Cx obtained 5/10/23    HOME HEALTH AGENCY:  [facility]  TIMES PER WEEK/DAYS:  1 x weekly  WOUND CARE ORDERS:    Left lower leg wound: Cleanse escared areas and apply Bactroban daily PRN, may leave FLAVIA  Follow up in about 1 month (around 11/17/2023) for PRN or if symptoms worsen.        Electronically signed:  Sarah Rodriguez NP